# Patient Record
Sex: FEMALE | Race: WHITE | Employment: FULL TIME | URBAN - METROPOLITAN AREA
[De-identification: names, ages, dates, MRNs, and addresses within clinical notes are randomized per-mention and may not be internally consistent; named-entity substitution may affect disease eponyms.]

---

## 2017-10-06 ENCOUNTER — HOSPITAL ENCOUNTER (OUTPATIENT)
Dept: MAMMOGRAPHY | Age: 62
Discharge: HOME | End: 2017-10-09
Attending: FAMILY MEDICINE | Admitting: FAMILY MEDICINE

## 2017-10-06 DIAGNOSIS — Z12.31 VISIT FOR SCREENING MAMMOGRAM: ICD-10-CM

## 2018-09-21 ENCOUNTER — OFFICE VISIT (OUTPATIENT)
Dept: FAMILY MEDICINE CLINIC | Age: 63
End: 2018-09-21

## 2018-09-21 VITALS
HEIGHT: 68 IN | HEART RATE: 49 BPM | WEIGHT: 197.8 LBS | DIASTOLIC BLOOD PRESSURE: 70 MMHG | SYSTOLIC BLOOD PRESSURE: 120 MMHG | BODY MASS INDEX: 29.98 KG/M2

## 2018-09-21 DIAGNOSIS — R00.1 SINUS BRADYCARDIA: ICD-10-CM

## 2018-09-21 DIAGNOSIS — E66.9 OBESITY (BMI 30.0-34.9): ICD-10-CM

## 2018-09-21 DIAGNOSIS — L85.3 DRY SKIN: ICD-10-CM

## 2018-09-21 DIAGNOSIS — L71.9 ROSACEA: ICD-10-CM

## 2018-09-21 DIAGNOSIS — Z00.00 ANNUAL PHYSICAL EXAM: Primary | ICD-10-CM

## 2018-09-21 DIAGNOSIS — Z80.41 FAMILY HISTORY OF OVARIAN CANCER: ICD-10-CM

## 2018-09-21 DIAGNOSIS — H61.21 IMPACTED CERUMEN, RIGHT EAR: ICD-10-CM

## 2018-09-21 DIAGNOSIS — Z23 NEED FOR INFLUENZA VACCINATION: ICD-10-CM

## 2018-09-21 PROBLEM — E66.811 OBESITY (BMI 30.0-34.9): Status: ACTIVE | Noted: 2018-09-21

## 2018-09-21 LAB
ANION GAP SERPL CALCULATED.3IONS-SCNC: 12 MMOL/L (ref 3–16)
BASOPHILS ABSOLUTE: 0 K/UL (ref 0–0.2)
BASOPHILS RELATIVE PERCENT: 0.6 %
BUN BLDV-MCNC: 16 MG/DL (ref 7–20)
CALCIUM SERPL-MCNC: 8.3 MG/DL (ref 8.3–10.6)
CHLORIDE BLD-SCNC: 107 MMOL/L (ref 99–110)
CHOLESTEROL, TOTAL: 230 MG/DL (ref 0–199)
CO2: 22 MMOL/L (ref 21–32)
CREAT SERPL-MCNC: 0.8 MG/DL (ref 0.6–1.2)
EOSINOPHILS ABSOLUTE: 0 K/UL (ref 0–0.6)
EOSINOPHILS RELATIVE PERCENT: 1.1 %
ESTIMATED AVERAGE GLUCOSE: 122.6 MG/DL
GFR AFRICAN AMERICAN: >60
GFR NON-AFRICAN AMERICAN: >60
GLUCOSE BLD-MCNC: 88 MG/DL (ref 70–99)
HBA1C MFR BLD: 5.9 %
HCT VFR BLD CALC: 38.9 % (ref 36–48)
HDLC SERPL-MCNC: 57 MG/DL (ref 40–60)
HEMOGLOBIN: 13 G/DL (ref 12–16)
HEPATITIS C ANTIBODY INTERPRETATION: NORMAL
LDL CHOLESTEROL CALCULATED: 149 MG/DL
LYMPHOCYTES ABSOLUTE: 1.6 K/UL (ref 1–5.1)
LYMPHOCYTES RELATIVE PERCENT: 35.1 %
MCH RBC QN AUTO: 29.5 PG (ref 26–34)
MCHC RBC AUTO-ENTMCNC: 33.3 G/DL (ref 31–36)
MCV RBC AUTO: 88.5 FL (ref 80–100)
MONOCYTES ABSOLUTE: 0.4 K/UL (ref 0–1.3)
MONOCYTES RELATIVE PERCENT: 9.4 %
NEUTROPHILS ABSOLUTE: 2.5 K/UL (ref 1.7–7.7)
NEUTROPHILS RELATIVE PERCENT: 53.8 %
PDW BLD-RTO: 14 % (ref 12.4–15.4)
PLATELET # BLD: 208 K/UL (ref 135–450)
PMV BLD AUTO: 8.5 FL (ref 5–10.5)
POTASSIUM SERPL-SCNC: 4.3 MMOL/L (ref 3.5–5.1)
RBC # BLD: 4.39 M/UL (ref 4–5.2)
SODIUM BLD-SCNC: 141 MMOL/L (ref 136–145)
TRIGL SERPL-MCNC: 118 MG/DL (ref 0–150)
TSH SERPL DL<=0.05 MIU/L-ACNC: 0.92 UIU/ML (ref 0.27–4.2)
VLDLC SERPL CALC-MCNC: 24 MG/DL
WBC # BLD: 4.6 K/UL (ref 4–11)

## 2018-09-21 PROCEDURE — 90471 IMMUNIZATION ADMIN: CPT | Performed by: FAMILY MEDICINE

## 2018-09-21 PROCEDURE — 90686 IIV4 VACC NO PRSV 0.5 ML IM: CPT | Performed by: FAMILY MEDICINE

## 2018-09-21 PROCEDURE — 93000 ELECTROCARDIOGRAM COMPLETE: CPT | Performed by: FAMILY MEDICINE

## 2018-09-21 PROCEDURE — 36415 COLL VENOUS BLD VENIPUNCTURE: CPT | Performed by: FAMILY MEDICINE

## 2018-09-21 PROCEDURE — 99386 PREV VISIT NEW AGE 40-64: CPT | Performed by: FAMILY MEDICINE

## 2018-09-21 RX ORDER — METRONIDAZOLE 10 MG/G
GEL TOPICAL 2 TIMES DAILY
Qty: 15 G | Refills: 2 | Status: SHIPPED | OUTPATIENT
Start: 2018-09-21 | End: 2022-07-11 | Stop reason: SDUPTHER

## 2018-09-21 ASSESSMENT — PATIENT HEALTH QUESTIONNAIRE - PHQ9
SUM OF ALL RESPONSES TO PHQ QUESTIONS 1-9: 0
1. LITTLE INTEREST OR PLEASURE IN DOING THINGS: 0
SUM OF ALL RESPONSES TO PHQ9 QUESTIONS 1 & 2: 0
2. FEELING DOWN, DEPRESSED OR HOPELESS: 0
SUM OF ALL RESPONSES TO PHQ QUESTIONS 1-9: 0

## 2018-09-21 NOTE — PROGRESS NOTES
Vaccine Information Sheet, \"Influenza - Inactivated\"  given to Randall Post, or parent/legal guardian of  Randall Post and verbalized understanding. Patient responses:    Have you ever had a reaction to a flu vaccine? No  Are you able to eat eggs without adverse effects? Yes  Do you have any current illness? No  Have you ever had Guillian [place] Syndrome? No    Flu vaccine given per order. Please see immunization tab.

## 2018-09-21 NOTE — PATIENT INSTRUCTIONS
Patient Education        Seborrheic Dermatitis: Care Instructions  Your Care Instructions  Seborrheic dermatitis (say \"xel-knm-YEH-ick nzi-oat-BD-tus\") is a skin problem that causes a reddish rash with greasy, flaky, yellow skin patches. The rash may appear on many parts of the body. It may be on the scalp, face (especially the eyebrow area and between the nose and mouth), ears, breasts, underarms, and genital area. The flaky skin on the scalp is called dandruff. This rash is often a long-term (chronic) condition. It may last for years. But the symptoms may come and go. Symptoms can be treated with special creams, shampoos, or other skin care. The cause of seborrheic dermatitis is not fully understood. It may occur when skin glands make too much oil. It may get worse in cold weather or with stress. A type of skin fungus, or yeast, may also be linked with this condition. Follow-up care is a key part of your treatment and safety. Be sure to make and go to all appointments, and call your doctor if you are having problems. It's also a good idea to know your test results and keep a list of the medicines you take. How can you care for yourself at home? · If your doctor prescribes a steroid cream, dandruff shampoo, or antifungal cream or medicine, use it as directed. If your doctor prescribes other medicine, take it as directed. · Use a dandruff shampoo if seborrheic dermatitis affects your scalp. This includes Head & Shoulders, Sebulex, and Selsun Blue. You may need to try a few kinds of shampoo to find the one that works best for you. · To help with itching:  ¨ Use hydrocortisone cream. Follow the directions on the label. ¨ Use cold, wet cloths. ¨ Take an over-the-counter antihistamine, such as diphenhydramine (Benadryl) or loratadine (Claritin). Read and follow all instructions on the label. When should you call for help?   Call your doctor now or seek immediate medical care if:    · You have signs of have, such as having a close relative with osteoporosis or taking a medicine that can weaken bones. Your doctor can tell you the best ways to protect your bones from thinning. Follow-up care is a key part of your treatment and safety. Be sure to make and go to all appointments, and call your doctor if you are having problems. It's also a good idea to know your test results and keep a list of the medicines you take. How can you care for yourself at home? · Get enough calcium and vitamin D. The [place] [employer] recommends adults younger than age 46 need 1,000 mg of calcium and 600 IU of vitamin D each day. Women ages 46 to 79 need 1,200 mg of calcium and 600 IU of vitamin D each day. Men ages 46 to 79 need 1,000 mg of calcium and 600 IU of vitamin D each day. Adults 71 and older need 1,200 mg of calcium and 800 IU of vitamin D each day. ¨ Eat foods rich in calcium, like yogurt, cheese, milk, and dark green vegetables. ¨ Eat foods rich in vitamin D, like eggs, fatty fish, cereal, and fortified milk. ¨ Get some sunshine. Your body uses sunshine to make its own vitamin D. The safest time to be out in the sun is before 10 a.m. or after 3 p.m. Avoid getting sunburned. Sunburn can increase your risk of skin cancer. ¨ Talk to your doctor about taking a calcium plus vitamin D supplement. Ask about what type of calcium is right for you, and how much to take at a time. Adults ages 23 to 48 should not get more than 2,500 mg of calcium and 4,000 IU of vitamin D each day, whether it is from supplements and/or food. Adults ages 46 and older should not get more than 2,000 mg of calcium and 4,000 IU of vitamin D each day from supplements and/or food. · Get regular bone-building exercise. Weight-bearing and resistance exercises keep bones healthy by working the muscles and bones against gravity. Start out at an exercise level that feels right for you.  Add a little at a time until you can do the following:  ¨ Do 30 minutes the fats are mainly from nuts, unsaturated oils (such as fish oils and olive oil), and certain nut or seed oils (such as canola, soybean, or flaxseed oil). These fats may help protect the heart and blood vessels. How can you get started on the Mediterranean diet? Here are some things you can do to switch to a more Mediterranean way of eating. What to eat  · Eat a variety of fruits and vegetables each day, such as grapes, blueberries, tomatoes, broccoli, peppers, figs, olives, spinach, eggplant, beans, lentils, and chickpeas. · Eat a variety of whole-grain foods each day, such as oats, brown rice, and whole wheat bread, pasta, and couscous. · Eat fish at least 2 times a week. Try tuna, salmon, mackerel, lake trout, herring, or sardines. · Eat moderate amounts of low-fat dairy products, such as milk, cheese, or yogurt. · Eat moderate amounts of poultry and eggs. · Choose healthy (unsaturated) fats, such as nuts, olive oil, and certain nut or seed oils like canola, soybean, and flaxseed. · Limit unhealthy (saturated) fats, such as butter, palm oil, and coconut oil. And limit fats found in animal products, such as meat and dairy products made with whole milk. Try to eat red meat only a few times a month in very small amounts. · Limit sweets and desserts to only a few times a week. This includes sugar-sweetened drinks like soda. The Mediterranean diet may also include red wine with your meal-1 glass each day for women and up to 2 glasses a day for men. Tips for eating at home  · Use herbs, spices, garlic, lemon zest, and citrus juice instead of salt to add flavor to foods. · Add avocado slices to your sandwich instead of sorensen. · Have fish for lunch or dinner instead of red meat. Brush the fish with olive oil, and broil or grill it. · Sprinkle your salad with seeds or nuts instead of cheese. · Cook with olive or canola oil instead of butter or oils that are high in saturated fat.   · Switch from 2% milk or

## 2018-09-25 PROBLEM — R73.03 PREDIABETES: Status: ACTIVE | Noted: 2018-09-25

## 2018-09-25 LAB
HPV COMMENT: NORMAL
HPV TYPE 16: NOT DETECTED
HPV TYPE 18: NOT DETECTED
HPVOH (OTHER TYPES): NOT DETECTED

## 2020-07-01 ENCOUNTER — OFFICE VISIT (OUTPATIENT)
Dept: PRIMARY CARE CLINIC | Age: 65
End: 2020-07-01

## 2020-07-01 ENCOUNTER — HOSPITAL ENCOUNTER (OUTPATIENT)
Age: 65
Setting detail: SPECIMEN
Discharge: HOME | End: 2020-07-01
Payer: COMMERCIAL

## 2020-07-01 VITALS — HEART RATE: 60 BPM | TEMPERATURE: 98.4 F | OXYGEN SATURATION: 98 %

## 2020-07-01 PROCEDURE — 99213 OFFICE O/P EST LOW 20 MIN: CPT | Performed by: FAMILY MEDICINE

## 2020-07-01 PROCEDURE — U0002 COVID-19 LAB TEST NON-CDC: HCPCS

## 2020-07-01 NOTE — PATIENT INSTRUCTIONS
Your COVID 19 will take 3-4 days for the results come back. The [address] will notify you if your test result are POSITIVE. We ask that you make a Mychart page and view your test results. The office will notify if results are Negative. You will need to Self quarantine until you know your results  Increase fluids rest  Saline nasal spray as directed  Warm salt gargles for throat discomfort  Monitor temperature twice a day  Tylenol for fevers and/or discomfort. If symptoms are worse -Go to the ER. Follow up with your primary doctor    To Whom it May Concern:    Omayra Pederson has been tested for COVID. They may not return to work until their lab test results back and they been fever free for 3 days.   If test is positive they must stay home for 2 weeks or until they test negative

## 2020-07-03 LAB
SARS-COV-2: NOT DETECTED
SOURCE: NORMAL

## 2020-07-06 ENCOUNTER — VIRTUAL VISIT (OUTPATIENT)
Dept: FAMILY MEDICINE CLINIC | Age: 65
End: 2020-07-06
Payer: COMMERCIAL

## 2020-07-06 ENCOUNTER — TELEPHONE (OUTPATIENT)
Dept: FAMILY MEDICINE CLINIC | Age: 65
End: 2020-07-06

## 2020-07-06 PROCEDURE — 99441 PR PHYS/QHP TELEPHONE EVALUATION 5-10 MIN: CPT | Performed by: FAMILY MEDICINE

## 2020-07-06 NOTE — PATIENT INSTRUCTIONS
October Fifth is the DEADLINE for voter registration for the November Third GENERAL ELECTION. Don't forget to vote!!!        [address] TO ALL APPOINTMENTS    The diagnoses and medications listed in this after visit summary may not be accurate at the time of check out. Please check MY CHART in 28-48 hours for possible corrections. Late cancellation policy: So that we can better accommodate people who are sick, please give our office 24 hour notice for an appointment cancellation. Thank you. Missed appointments: Your care is very important to us. It is important that you keep your scheduled appointments. Multiple missed appointments will lead to a dismissal from the office. Later arrival policy: If you are more than 10 minutes late for your appointment, you will be asked to reschedule. Please allow 5-7 business days for paperwork to be processed. It is important that you check your MY Chart messages, as they include appointment reminders, test results, and other important information. If you have forgotten your password, please call [phone].

## 2020-07-06 NOTE — TELEPHONE ENCOUNTER
Patient stated that she went and got tested for the COVID-19 test on Wednesday and got her results back today which was negative, patient stated that the reason she went to get test is because she had an headache. Patient stated that she is still having the headaches and she is having to take Tylenol every 6 hours, when she takes the tylenol the headache goes away but returns 6 hours later. Patient asking what can she do?  Please advise

## 2020-07-06 NOTE — TELEPHONE ENCOUNTER
Patient called7/3/2020. Just got tested for COVID-19  She does not know the results. She went there because of her symptoms of fever and headache. Is asking what she can take for her headache. She is already taking Tylenol extra strength 2 tablets every 6 hours. The headache is dull. There is no specific location of the headache    Told her she could add on ibuprofen 800 mg every 8 hours as needed. She understands.

## 2020-07-06 NOTE — PROGRESS NOTES
Kishan Quach is a 59 y.o. female evaluated via telephone on 7/6/2020. Consent:  She and/or health care decision maker is aware that that she may receive a bill for this telephone service, depending on her insurance coverage, and has provided verbal consent to proceed: Yes      Documentation:  I communicated with the patient and/or health care decision maker about headache:  See telephone encounter. Continued to have HA up to yesterday. Now PATTEN is much improved. There is no headache currently. She got the results of her COVID-19 testing which was negative. This made a big difference in regards to her stress level headache is diffuse and does go into her lower neck and upper back. Details of this discussion including any medical advice provided: Can take Tylenol up to thousand milligrams 3 times a day. Can take the ibuprofen up to 800 mg 3 times a day. Can also use ice packs on the back of her neck. Alternate so that is not keeping an ice pack in 1 place more than 20 minutes/h. I did offer her a muscle relaxer which she declined to take. Diagnosis Orders   1. Tension headache           I affirm this is a Patient Initiated Episode with a Patient who has not had a related appointment within my department in the past 7 days or scheduled within the next 24 hours.     Patient identification was verified at the start of the visit: Yes    Total Time: minutes: 5-10 minutes    Note: not billable if this call serves to triage the patient into an appointment for the relevant concern      Melani Connors

## 2020-07-14 ENCOUNTER — TELEPHONE (OUTPATIENT)
Dept: FAMILY MEDICINE CLINIC | Age: 65
End: 2020-07-14

## 2020-07-14 NOTE — TELEPHONE ENCOUNTER
She wants to know if she can do a sick visit, that this been going on for two weeks. The fever comes and go over the weekend it was bad. But its the headaches, she was not well at all last night. She says the only thing that is helping her us the motrinand tylenol is the only thing that is helping her. She wants to seen, if you want her to get tested again for the COVID again she will. If you want her to go to the ER she will, but everything happens at night and she does not want to go the ER and having them looking at her like she is nuts. Again patient stated she just want to see doctor because she has been sick for two weeks.

## 2020-07-14 NOTE — TELEPHONE ENCOUNTER
Please see my chart message below. Please call patient and postpone her appointment until she is feeling better. When she is feeling better, we can schedule her physical 2 weeks later. I have a Visit on July 15th at 8:15 AM.  I have been tested for [address] but was negative. I want you to understand that my symptoms can be chills, fever, muscle ache but mainly head and neck ache that only goes away with Tylenol or motrin. Usually my temperature spikes at night. It's usually fine in the morning. I will wear a mask  But you might want to hustle me into a room. [email or web address]  home phone [phone]     I did receive notice of my appointment and plan to attend. Thank you.

## 2020-07-15 ENCOUNTER — OFFICE VISIT (OUTPATIENT)
Dept: PRIMARY CARE CLINIC | Age: 65
End: 2020-07-15
Payer: COMMERCIAL

## 2020-07-15 ENCOUNTER — HOSPITAL ENCOUNTER (OUTPATIENT)
Age: 65
Setting detail: SPECIMEN
Discharge: HOME | End: 2020-07-15
Payer: COMMERCIAL

## 2020-07-15 VITALS — TEMPERATURE: 97.2 F

## 2020-07-15 PROCEDURE — 99213 OFFICE O/P EST LOW 20 MIN: CPT | Performed by: FAMILY MEDICINE

## 2020-07-15 PROCEDURE — U0002 COVID-19 LAB TEST NON-CDC: HCPCS

## 2020-07-15 RX ORDER — DOXYCYCLINE HYCLATE 100 MG
100 TABLET ORAL 2 TIMES DAILY
Qty: 28 TABLET | Refills: 0 | Status: SHIPPED | OUTPATIENT
Start: 2020-07-15 | End: 2020-07-29

## 2020-07-15 NOTE — PATIENT INSTRUCTIONS
Your COVID 19 will take 3-4 days for the results come back. The [address] will notify you if your test result are POSITIVE. We ask that you make a Mychart page and view your test results. The office will notify if results are Negative. You will need to Self quarantine until you know your results  Increase fluids rest  Saline nasal spray as directed  Warm salt gargles for throat discomfort  Monitor temperature twice a day  Tylenol for fevers and/or discomfort. If symptoms are worse -Go to the ER. Follow up with your primary doctor    To Whom it May Concern:    Zulma Bettencourt has been tested for COVID. They may not return to work until their lab test results back and they been fever free for 3 days.   If test is positive they must stay home for 2 weeks or until they test negative

## 2020-07-15 NOTE — PROGRESS NOTES
7/15/2020    HPI:  Chief complaint and history of present illness as per medical assistant/nurse documented today in the Flu/COVID-19 clinic. Rash for 5 days. Intermittant fever for 2 wks. Both mosquito and tick poss exposures. MEDICATIONS:  Prior to Visit Medications    Medication Sig Taking? Authorizing Provider   metroNIDAZOLE (METROGEL) 1 % gel Apply topically 2 times daily  Tung Holt MD           PHYSICAL EXAM:  cta  rrr no murmur  6 cm erythemic patches b ue's    ASSESSMENT/PLAN:  1. Flu-like symptoms  R/o vid - doubt    Probable vector borne illness    Doxy 100 bid for 14 days    - COVID-19 Ambulatory      FOLLOW-UP:  No follow-ups on file.     In addition to other information, the printed after visit summary provided to the patient includes:  [x] COVID-19 Self care instructions  [x] COVID-19 General patient information

## 2020-07-15 NOTE — PROGRESS NOTES
7/15/20  Gen Hicks  1955    FLU/COVID-19 CLINIC EVALUATION Pt seen 2 weeks ago (-) Covid result. SX persist.    HPI SYMPTOMS:    Employer: [employer]    [x] Fevers  [] Chills  [] Cough  [] Coughing up blood  [] Chest Congestion  [] Nasal Congestion  [] Feeling short of breath  [] Sometimes  [] Frequently  [] All the time  [] Chest pain  [x] Headaches  [x]Tolerable  [] Severe  [] Sore throat  [] Muscle aches  [] Nausea  [] Vomiting  []Unable to keep fluids down  [] Diarrhea  []Severe    [] OTHER SYMPTOMS:      Symptom Duration:   [] 1  [] 2   [] 3   [] 4    [] 5   [] 6   [] 7   [] 8   [] 9   [] 10   [] 11   [] 12   [] 13   [] 14   [x] Longer than 14 days    Symptom course:   [x] Worsening     [] Stable     [] Improving    RISK FACTORS:    [] Pregnant or possibly pregnant  [] Age over 61  [] Diabetes  [] Heart disease  [] Asthma  [] COPD/Other chronic lung diseases  [] Active Cancer  [] On Chemotherapy  [] Taking oral steroids  [] History Lymphoma/Leukemia  [] Close contact with a lab confirmed COVID-19 patient within 14 days of symptom onset  [] History of travel from affected geographical areas within 14 days of symptom onset       VITALS:  There were no vitals filed for this visit. TESTS:    POCT FLU:  [] Positive     []Negative    ASSESSMENT:    [] Flu  [] Possible COVID-19  [] Strep    PLAN:    [] Discharge home with written instructions for:  [] Flu management  [] Possible COVID-19 infection with self-quarantine and management of symptoms  [] Follow-up with primary care physician or emergency department if worsens  [] Evaluation per physician/nurse practitioner in clinic  [] Sent to ER       An  electronic signature was used to authenticate this note.      --Heri Anna MA on 7/15/2020 at 8:06 AM

## 2020-07-16 ENCOUNTER — HOSPITAL ENCOUNTER (OUTPATIENT)
Age: 65
Discharge: HOME | End: 2020-07-16
Payer: COMMERCIAL

## 2020-07-16 ENCOUNTER — VIRTUAL VISIT (OUTPATIENT)
Dept: FAMILY MEDICINE CLINIC | Age: 65
End: 2020-07-16
Payer: COMMERCIAL

## 2020-07-16 LAB
BASOPHILS ABSOLUTE: 0 K/CU MM
BASOPHILS RELATIVE PERCENT: 0.4 % (ref 0–1)
DIFFERENTIAL TYPE: ABNORMAL
EOSINOPHILS ABSOLUTE: 0.1 K/CU MM
EOSINOPHILS RELATIVE PERCENT: 0.9 % (ref 0–3)
ERYTHROCYTE SEDIMENTATION RATE: 86 MM/HR (ref 0–30)
HCT VFR BLD CALC: 35.9 % (ref 37–47)
HEMOGLOBIN: 11.2 GM/DL (ref 12.5–16)
IMMATURE NEUTROPHIL %: 0.7 % (ref 0–0.43)
LYMPHOCYTES ABSOLUTE: 1.3 K/CU MM
LYMPHOCYTES RELATIVE PERCENT: 16.8 % (ref 24–44)
MCH RBC QN AUTO: 28.6 PG (ref 27–31)
MCHC RBC AUTO-ENTMCNC: 31.2 % (ref 32–36)
MCV RBC AUTO: 91.6 FL (ref 78–100)
MONOCYTES ABSOLUTE: 0.3 K/CU MM
MONOCYTES RELATIVE PERCENT: 4.4 % (ref 0–4)
NUCLEATED RBC %: 0 %
PDW BLD-RTO: 13.5 % (ref 11.7–14.9)
PLATELET # BLD: 286 K/CU MM (ref 140–440)
PMV BLD AUTO: 9.7 FL (ref 7.5–11.1)
RBC # BLD: 3.92 M/CU MM (ref 4.2–5.4)
SEGMENTED NEUTROPHILS ABSOLUTE COUNT: 5.8 K/CU MM
SEGMENTED NEUTROPHILS RELATIVE PERCENT: 76.8 % (ref 36–66)
TOTAL CK: 21 IU/L (ref 26–140)
TOTAL IMMATURE NEUTOROPHIL: 0.05 K/CU MM
TOTAL NUCLEATED RBC: 0 K/CU MM
WBC # BLD: 7.5 K/CU MM (ref 4–10.5)

## 2020-07-16 PROCEDURE — 99213 OFFICE O/P EST LOW 20 MIN: CPT | Performed by: FAMILY MEDICINE

## 2020-07-16 PROCEDURE — 85652 RBC SED RATE AUTOMATED: CPT

## 2020-07-16 PROCEDURE — 86788 WEST NILE VIRUS AB IGM: CPT

## 2020-07-16 PROCEDURE — 86789 WEST NILE VIRUS ANTIBODY: CPT

## 2020-07-16 PROCEDURE — 86618 LYME DISEASE ANTIBODY: CPT

## 2020-07-16 PROCEDURE — 82550 ASSAY OF CK (CPK): CPT

## 2020-07-16 PROCEDURE — 82085 ASSAY OF ALDOLASE: CPT

## 2020-07-16 PROCEDURE — 36415 COLL VENOUS BLD VENIPUNCTURE: CPT

## 2020-07-16 PROCEDURE — 85025 COMPLETE CBC W/AUTO DIFF WBC: CPT

## 2020-07-16 RX ORDER — TIZANIDINE 4 MG/1
4 TABLET ORAL EVERY 8 HOURS PRN
Qty: 25 TABLET | Refills: 0 | Status: SHIPPED | OUTPATIENT
Start: 2020-07-16 | End: 2021-02-22

## 2020-07-16 NOTE — PATIENT INSTRUCTIONS
October Fifth is the DEADLINE for voter registration for the November Third GENERAL ELECTION. Don't forget to vote!!!        [address] TO ALL APPOINTMENTS    The diagnoses and medications listed in this after visit summary may not be accurate at the time of check out. Please check MY CHART in 28-48 hours for possible corrections. Late cancellation policy: So that we can better accommodate people who are sick, please give our office 24 hour notice for an appointment cancellation. Thank you. Missed appointments: Your care is very important to us. It is important that you keep your scheduled appointments. Multiple missed appointments will lead to a dismissal from the office. Later arrival policy: If you are more than 10 minutes late for your appointment, you will be asked to reschedule. Please allow 5-7 business days for paperwork to be processed. It is important that you check your MY Chart messages, as they include appointment reminders, test results, and other important information. If you have forgotten your password, please call [phone].

## 2020-07-16 NOTE — PROGRESS NOTES
2020    TELEHEALTH EVALUATION -- Audio/Visual (During AQIXR-63 public health emergency)    HPI:    Con Cobian (:  1955) has requested an audio/video evaluation for the following concern(s):        Muscle aches: got re-tested for Covid 19. Muscle aches and fevers for 2 weeks. Shoulder, head, and neck pain on the left. Fever at night. Taking Advil and Motrin. Legs and back aching. The doctor at the [facility] thought she might have  Been exposed to a tick bite. Therefore he sent over doxycycline antibiotics. She has taken 3 antibiotic pills so far. Review of Systems   Constitutional: Positive for chills and fever. Negative for activity change. Musculoskeletal: Positive for myalgias. Prior to Visit Medications    Medication Sig Taking? Authorizing Provider   doxycycline hyclate (VIBRA-TABS) 100 MG tablet Take 1 tablet by mouth 2 times daily for 14 days  Deric Dunaway MD   metroNIDAZOLE (METROGEL) 1 % gel Apply topically 2 times daily  Braeden Escobar MD       Social History     Tobacco Use    Smoking status: Never Smoker    Smokeless tobacco: Never Used   Substance Use Topics    Alcohol use: No    Drug use: Not on file        Allergies   Allergen Reactions    Dye [Iodides] Hives   , No past medical history on file.     PHYSICAL EXAMINATION:  [ INSTRUCTIONS:  \"[x]\" Indicates a positive item  \"[]\" Indicates a negative item  -- DELETE ALL ITEMS NOT EXAMINED]  Vital Signs: (As obtained by patient/caregiver or practitioner observation)    Blood pressure-  Heart rate-    Respiratory rate-    Temperature-  Pulse oximetry-     Constitutional: [x] Appears well-developed and well-nourished [x] No apparent distress      [] Abnormal-   Mental status  [x] Alert and awake  [x] Oriented to person/place/time []Able to follow commands      Eyes:  EOM    []  Normal  [] Abnormal-  Sclera  []  Normal  [] Abnormal -         Discharge []  None visible  [] Abnormal -    HENT:   [x] Normocephalic, atraumatic. [] Abnormal   [] Mouth/Throat: Mucous membranes are moist.     External Ears [] Normal  [] Abnormal-     Neck: [x] No visualized mass     Pulmonary/Chest: [x] Respiratory effort normal.  [x] No visualized signs of difficulty breathing or respiratory distress        [] Abnormal-      Musculoskeletal:   [x] Normal gait with no signs of ataxia         [] Normal range of motion of neck        [] Abnormal-       Neurological:        [x] No Facial Asymmetry (Cranial nerve 7 motor function) (limited exam to video visit)          [] No gaze palsy        [] Abnormal-         Skin:        [] No significant exanthematous lesions or discoloration noted on facial skin         [] Abnormal-            Psychiatric:       [x] Normal Affect [] No Hallucinations        [] Abnormal-     Other pertinent observable physical exam findings-     ASSESSMENT/PLAN:  There are no diagnoses linked to this encounter. Diagnosis Orders   1. Fever, unspecified fever cause  Lyme Disease AB Total W Rflx to IgG/ IgM    [place] ANTIBODIES, IGG AND IGM    CBC Auto Differential    ALDOLASE    CK    Sedimentation Rate   2. Muscle ache  Lyme Disease AB Total W Rflx to IgG/ IgM    [place] ANTIBODIES, IGG AND IGM    CBC Auto Differential    ALDOLASE    CK    Sedimentation Rate    tiZANidine (ZANAFLEX) 4 MG tablet       No follow-ups on file. Tiff Olivo is a 59 y.o. female being evaluated by a Virtual Visit (video visit) encounter to address concerns as mentioned above. A caregiver was present when appropriate. Due to this being a TeleHealth encounter (During JOXEK-08 public health emergency), evaluation of the following organ systems was limited: Vitals/Constitutional/EENT/Resp/CV/GI//MS/Neuro/Skin/Heme-Lymph-Imm.   Pursuant to the emergency declaration under the [facility], [address] [place] [address] authority and the [employer] General Act, this Virtual Visit was conducted with patient's (and/or legal guardian's) consent, to reduce the patient's risk of exposure to COVID-19 and provide necessary medical care. The patient (and/or legal guardian) has also been advised to contact this office for worsening conditions or problems, and seek emergency medical treatment and/or call 911 if deemed necessary. Patient identification was verified at the start of the visit: Yes    Total time spent on this encounter: Not billed by time    Services were provided through a video synchronous discussion virtually to substitute for in-person clinic visit. Patient and provider were located at their individual homes. --Andrei Blankenship MD on 7/16/2020 at 9:21 AM    An electronic signature was used to authenticate this note.

## 2020-07-18 LAB — ALDOLASE: 8.6 U/L (ref 1.5–8.1)

## 2020-07-19 LAB
BORRELIA BURGDORFERI AB IGG: POSITIVE
LYME AB IGM BY WB:: POSITIVE

## 2020-07-20 LAB
SARS-COV-2: NOT DETECTED
SOURCE: NORMAL

## 2020-07-22 LAB
WEST NILE VIRUS, IGG: 0.13 IV
WEST NILE VIRUS, IGM: NORMAL IV

## 2021-02-22 ENCOUNTER — OFFICE VISIT (OUTPATIENT)
Dept: FAMILY MEDICINE CLINIC | Age: 66
End: 2021-02-22
Payer: MEDICARE

## 2021-02-22 VITALS
SYSTOLIC BLOOD PRESSURE: 116 MMHG | WEIGHT: 201.4 LBS | TEMPERATURE: 97.1 F | DIASTOLIC BLOOD PRESSURE: 72 MMHG | HEART RATE: 79 BPM | BODY MASS INDEX: 30.52 KG/M2 | HEIGHT: 68 IN | OXYGEN SATURATION: 95 %

## 2021-02-22 DIAGNOSIS — Z13.220 SCREENING CHOLESTEROL LEVEL: ICD-10-CM

## 2021-02-22 DIAGNOSIS — Z23 NEED FOR 23-POLYVALENT PNEUMOCOCCAL POLYSACCHARIDE VACCINE: ICD-10-CM

## 2021-02-22 DIAGNOSIS — Z00.00 ANNUAL PHYSICAL EXAM: Primary | ICD-10-CM

## 2021-02-22 DIAGNOSIS — Z78.0 POST-MENOPAUSAL: ICD-10-CM

## 2021-02-22 DIAGNOSIS — Z23 NEED FOR SHINGLES VACCINE: ICD-10-CM

## 2021-02-22 DIAGNOSIS — Z80.41 FAMILY HISTORY OF OVARIAN CANCER: ICD-10-CM

## 2021-02-22 DIAGNOSIS — Z13.1 SCREENING FOR DIABETES MELLITUS: ICD-10-CM

## 2021-02-22 DIAGNOSIS — Z12.31 ENCOUNTER FOR SCREENING MAMMOGRAM FOR MALIGNANT NEOPLASM OF BREAST: ICD-10-CM

## 2021-02-22 DIAGNOSIS — Z12.11 SCREEN FOR COLON CANCER: ICD-10-CM

## 2021-02-22 DIAGNOSIS — Z23 NEEDS FLU SHOT: ICD-10-CM

## 2021-02-22 DIAGNOSIS — R73.03 PREDIABETES: ICD-10-CM

## 2021-02-22 LAB
CHOLESTEROL, TOTAL: 261 MG/DL (ref 0–199)
ESTIMATED AVERAGE GLUCOSE: 122.6 MG/DL
HBA1C MFR BLD: 5.9 %
HDLC SERPL-MCNC: 63 MG/DL (ref 40–60)
LDL CHOLESTEROL CALCULATED: 173 MG/DL
TRIGL SERPL-MCNC: 124 MG/DL (ref 0–150)
VLDLC SERPL CALC-MCNC: 25 MG/DL

## 2021-02-22 PROCEDURE — 90694 VACC AIIV4 NO PRSRV 0.5ML IM: CPT | Performed by: FAMILY MEDICINE

## 2021-02-22 PROCEDURE — 36415 COLL VENOUS BLD VENIPUNCTURE: CPT | Performed by: FAMILY MEDICINE

## 2021-02-22 PROCEDURE — G0008 ADMIN INFLUENZA VIRUS VAC: HCPCS | Performed by: FAMILY MEDICINE

## 2021-02-22 PROCEDURE — 90732 PPSV23 VACC 2 YRS+ SUBQ/IM: CPT | Performed by: FAMILY MEDICINE

## 2021-02-22 PROCEDURE — G0009 ADMIN PNEUMOCOCCAL VACCINE: HCPCS | Performed by: FAMILY MEDICINE

## 2021-02-22 PROCEDURE — 99397 PER PM REEVAL EST PAT 65+ YR: CPT | Performed by: FAMILY MEDICINE

## 2021-02-22 ASSESSMENT — PATIENT HEALTH QUESTIONNAIRE - PHQ9
1. LITTLE INTEREST OR PLEASURE IN DOING THINGS: 0
SUM OF ALL RESPONSES TO PHQ QUESTIONS 1-9: 0
SUM OF ALL RESPONSES TO PHQ QUESTIONS 1-9: 0
2. FEELING DOWN, DEPRESSED OR HOPELESS: 0

## 2021-02-22 NOTE — PATIENT INSTRUCTIONS
Learning About the [facility] Diet  What is the Mediterranean diet? The Mediterranean diet is a style of eating rather than a diet plan. It features foods eaten in [place], [place], [place] and [place], and other countries along the [facility]. It emphasizes eating foods like fish, fruits, vegetables, beans, high-fiber breads and whole grains, nuts, and olive oil. This style of eating includes limited red meat, cheese, and sweets. Why choose the Mediterranean diet? A Mediterranean-style diet may improve heart health. It contains more fat than other heart-healthy diets. But the fats are mainly from nuts, unsaturated oils (such as fish oils and olive oil), and certain nut or seed oils (such as canola, soybean, or flaxseed oil). These fats may help protect the heart and blood vessels. How can you get started on the Mediterranean diet? Here are some things you can do to switch to a more Mediterranean way of eating. What to eat  · Eat a variety of fruits and vegetables each day, such as grapes, blueberries, tomatoes, broccoli, peppers, figs, olives, spinach, eggplant, beans, lentils, and chickpeas. · Eat a variety of whole-grain foods each day, such as oats, brown rice, and whole wheat bread, pasta, and couscous. · Eat fish at least 2 times a week. Try tuna, salmon, mackerel, lake trout, herring, or sardines. · Eat moderate amounts of low-fat dairy products, such as milk, cheese, or yogurt. · Eat moderate amounts of poultry and eggs. · Choose healthy (unsaturated) fats, such as nuts, olive oil, and certain nut or seed oils like canola, soybean, and flaxseed. · Limit unhealthy (saturated) fats, such as butter, palm oil, and coconut oil. And limit fats found in animal products, such as meat and dairy products made with whole milk. Try to eat red meat only a few times a month in very small amounts. · Limit sweets and desserts to only a few times a week. This includes sugar-sweetened drinks like soda. The Mediterranean diet may also include red wine with your meal1 glass each day for women and up to 2 glasses a day for men. Tips for eating at home  · Use herbs, spices, garlic, lemon zest, and citrus juice instead of salt to add flavor to foods. · Add avocado slices to your sandwich instead of sorensen. · Have fish for lunch or dinner instead of red meat. Brush the fish with olive oil, and broil or grill it. · Sprinkle your salad with seeds or nuts instead of cheese. · Cook with olive or canola oil instead of butter or oils that are high in saturated fat. · Switch from 2% milk or whole milk to 1% or fat-free milk. · Dip raw vegetables in a vinaigrette dressing or hummus instead of dips made from mayonnaise or sour cream.  · Have a piece of fruit for dessert instead of a piece of cake. Try baked apples, or have some dried fruit. Tips for eating out  · Try broiled, grilled, baked, or poached fish instead of having it fried or breaded. · Ask your  to have your meals prepared with olive oil instead of butter. · Order dishes made with marinara sauce or sauces made from olive oil. Avoid sauces made from cream or mayonnaise. · Choose whole-grain breads, whole wheat pasta and pizza crust, brown rice, beans, and lentils. · Cut back on butter or margarine on bread. Instead, you can dip your bread in a small amount of olive oil. · Ask for a side salad or grilled vegetables instead of french fries or chips. Where can you learn more? Go to https://OVIVO Mobile CommunicationspeFliteeb.Client24. org and sign in to your Ancestry account. Enter [phone] in the [facility] box to learn more about \"Learning About the Mediterranean Diet. \"     If you do not have an account, please click on the \"Sign Up Now\" link.   Current as of: November 7, 2018  Content Version: 12.1 © 9803-4584 [employer]. Care instructions adapted under license by [facility] ([facility]). If you have questions about a medical condition or this instruction, always ask your healthcare professional. [address] any warranty or liability for your use of this information. PLEASE BRING YOUR MEDICATIONS TO ALL APPOINTMENTS    The diagnoses and medications listed in this after visit summary may not be accurate at the time of check out. Please check MY CHART in 28-48 hours for possible corrections. Late cancellation policy: So that we can better accommodate people who are sick, please give our office 24 hour notice for an appointment cancellation. Thank you. Missed appointments: Your care is very important to us. It is important that you keep your scheduled appointments. Multiple missed appointments will lead to a dismissal from the office. Later arrival policy: If you are more than 10 minutes late for your appointment, you will be asked to reschedule. Please allow 5-7 business days for paperwork to be processed. It is important that you check your MY Chart messages, as they include appointment reminders, test results, and other important information. If you have forgotten your password, please call [phone]. HERE ARE SOME LIFE CHANGING TIPS      1. Take your blood pressure medications at night. This reduces your chance of cardiovascular event by half  2. Fever in kids: It's best to give both Tylenol and Ibuprofen at the same time rather than staggering them which is confusing  3. Follow these tips to reduce childhood obesity: Reduce unnecessary exposure to antibiotics, consume whole milk instead of skim milk, watch public TV instead of regular TV (less exposure to junk food commercials), and reduce traumatic experiences. 4. 1 egg per day is good for your heart  5. Alternate day fasting does promote weight loss. 6. Skipping breakfast increases your risk of obesity  7. Artificially sweetened drinks increase all cause mortality (strokes, BMI, cardiovascular)  8. Kale consumption can reduce onset of dementia  9. Walking at least 8000 steps per day and resistance exercise 2-3 x per week are good for your heart  10. Covid 19:  Wearing gloves is not that helpful  Having low vitamin D levels increases risk of infection (take 2-4000 units of D3 per day until our covid crisis is resolved)  11. Brushing teeth 3 times per day can decrease chance of getting diabetes    CHECK OUT KALEIGH ROSALES [facility] [address]     Walking for Exercise: Care Instructions  Your Care Instructions    Walking is one of the easiest ways to get the exercise you need for good health. A brisk, 30-minute walk each day can help you feel better and have more energy. It can help you lower your risk of disease. Walking can help you keep your bones strong and your heart healthy. Check with your doctor before you start a walking plan if you have heart problems, other health issues, or you have not been active in a long time. Follow your doctor's instructions for safe levels of exercise. Follow-up care is a key part of your treatment and safety. Be sure to make and go to all appointments, and call your doctor if you are having problems. It's also a good idea to know your test results and keep a list of the medicines you take. How can you care for yourself at home? Getting started  · Start slowly and set a short-term goal. For example, walk for 5 or 10 minutes every day. · Bit by bit, increase the amount you walk every day. Try for at least 30 minutes on most days of the week. You also may want to swim, bike, or do other activities. · If finding enough time is a problem, it is fine to be active in blocks of 10 minutes or more throughout your day and week. · To get the heart-healthy benefits of walking, you need to walk briskly enough to increase your heart rate and breathing, but not so fast that you cannot talk comfortably. · Wear comfortable shoes that fit well and provide good support for your feet and ankles. Staying with your plan  · After you've made walking a habit, set a longer-term goal. You may want to set a goal of walking briskly for longer or walking farther. Experts say to do 2½ hours of moderate activity a week. A faster heartbeat is what defines moderate-level activity. · To stay motivated, walk with friends, coworkers, or pets. · Use a phone grace or pedometer to track your steps each day. Set a goal to increase your steps. Once you get there, set a higher goal. Aim for 10,000 steps a day. · If the weather keeps you from walking outside, go for walks at the mall with a friend. Local schools and churches may have indoor gyms where you can walk. Fitting a walk into your workday  · Park several blocks away from work, or get off the bus a few stops early. · Use the stairs instead of the elevator, at least for a few floors. · Suggest holding meetings with colleagues during a walk inside or outside the building. · Use the restroom that is the farthest from your desk or workstation. · Use your morning and afternoon breaks to take quick 15-minute walks. Staying safe  · Know your surroundings. Walk in a well-lighted, safe place. If it is dark, walk with a partner. Wear light-colored clothing. If you can, buy a vest or jacket that reflects light. · Carry a cell phone for emergencies. · Drink plenty of water. Take a water bottle with you when you walk. This is very important if it is hot out. · Be careful not to slip on wet or icy ground. You can buy \"grippers\" for your shoes to help keep you from slipping. · Pay attention to your walking surface. Use sidewalks and paths. · If you have breathing problems like asthma or COPD, ask your doctor when it is safe for you to walk outdoors. Cold, dry air, smog, pollen, or other things in the air could cause breathing problems. Where can you learn more? Go to https://chpekoeb.Sayah. org and sign in to your ROKT account. Enter R159 in the [employer] box to learn more about \"Walking for Exercise: Care Instructions. \"     If you do not have an account, please click on the \"Sign Up Now\" link. Current as of: August 19, 2018  Content Version: 12.0  © 6025-7400 Healthwise, Incorporated. Care instructions adapted under license by [facility] ([facility]). If you have questions about a medical condition or this instruction, always ask your healthcare professional. [address] any warranty or liability for your use of this information.

## 2021-02-22 NOTE — PROGRESS NOTES
History and Physical      Disha Roe  YOB: 1955    Date of Service:  2/22/2021    Chief Complaint:   Disha Roe is a 72 y.o. female who presents for complete physical examination. HPI: Here for physical       Alcohol rare  Exercise: frequent  Seatbelt use:Yes  Sees dentist every 6 months: no  Intimate partner violence? No  Substance abuse? no    Wt Readings from Last 3 Encounters:   02/22/21 201 lb 6.4 oz (91.4 kg)   09/21/18 197 lb 12.8 oz (89.7 kg)     BP Readings from Last 3 Encounters:   02/22/21 116/72   09/21/18 120/70       Patient Active Problem List   Diagnosis    Obesity (BMI 30.0-34. 9)    Family history of ovarian cancer    Rosacea    Prediabetes    Other hyperlipidemia       Preventive Care:  Health Maintenance   Topic Date Due    Shingles Vaccine (1 of 2) Never done    Colon cancer screen colonoscopy  Never done    DEXA (modify frequency per FRAX score)  Never done    Breast cancer screen  10/06/2018    COVID-19 Vaccine (2 - Moderna 2-dose series) 03/09/2021    HIV screen  09/21/2068 (Originally 11/10/1970)    A1C test (Diabetic or Prediabetic)  02/22/2022    Lipid screen  02/22/2022    Cervical cancer screen  09/21/2023    DTaP/Tdap/Td vaccine (2 - Td) 06/09/2025    Flu vaccine  Completed    Pneumococcal 65+ years Vaccine  Completed    Hepatitis C screen  Completed    Hepatitis A vaccine  Aged Out    Hepatitis B vaccine  Aged Out    Hib vaccine  Aged Out    Meningococcal (ACWY) vaccine  Aged Out      Lipid panel:   Lab Results   Component Value Date    CHOL 261 (H) 02/22/2021    TRIG 124 02/22/2021    HDL 63 (H) 02/22/2021    LDLCALC 173 (H) 02/22/2021          Immunization History   Administered Date(s) Administered    COVID-19, Moderna, PF, 100mcg/0.5mL 02/09/2021    Influenza Virus Vaccine 09/24/2014, 09/24/2015    Influenza Whole 11/09/2011    Influenza, Quadv, IM, PF (6 mo and older Fluzone, Flulaval, Fluarix, and 3 yrs and older Afluria) 09/25/2013, 09/25/2014, 10/13/2016, 10/31/2017, 09/21/2018    Influenza, Quadv, adjuvanted, 65 yrs +, IM, PF (Fluad) 02/22/2021    Pneumococcal Polysaccharide ([id number]) 02/22/2021    Tdap (Boostrix, Adacel) 06/09/2015       Allergies   Allergen Reactions    Dye [Iodides] Hives     No outpatient medications have been marked as taking for the 2/22/21 encounter (Office Visit) with Inez Mcgraw MD.       No past medical history on file. Past Surgical History:   Procedure Laterality Date    APPENDECTOMY      PAROTIDECTOMY      partial    TONSILLECTOMY       Family History   Problem Relation Age of Onset    High Cholesterol Sister     Ovarian Cancer Mother     Ovarian Cancer Maternal Aunt          Review of Systems:  A comprehensive review of systems was negative except for what was noted in the HPI. Physical Exam:   Vitals:    02/22/21 0818   BP: 116/72   Pulse: 79   Temp: 97.1 °F (36.2 °C)   TempSrc: Infrared   SpO2: 95%   Weight: 201 lb 6.4 oz (91.4 kg)   Height: 5' 7.75\" (1.721 m)     Body mass index is 30.85 kg/m². Vitals:    02/22/21 0818   BP: 116/72   Pulse: 79   Temp: 97.1 °F (36.2 °C)   TempSrc: Infrared   SpO2: 95%   Weight: 201 lb 6.4 oz (91.4 kg)   Height: 5' 7.75\" (1.721 m)     Body mass index is 30.85 kg/m². Constitutional: She is oriented to person, place, and time. She appears well-developed and well-nourished. No distress. HEENT:   Head: Normocephalic and atraumatic. Right Ear: Tympanic membrane, external ear and ear canal normal.   Left Ear: Tympanic membrane, external ear and ear canal normal.   Nose: Nose normal.   Mouth/Throat: Oropharynx is clear and moist, and mucous membranes are normal.  There is no cervical adenopathy. Eyes: Conjunctivae and extraocular motions are normal. Pupils are equal, round, and reactive to light. Neck: Neck supple. No JVD present. Carotid bruit is not present. No mass and no thyromegaly present.    Cardiovascular: Normal rate, regular rhythm, normal heart sounds and intact distal pulses. Exam reveals no gallop and no friction rub. No murmur heard. Pulmonary/Chest: Effort normal and breath sounds normal. No respiratory distress. She has no wheezes, rhonchi or rales. Abdominal: Soft, non-tender. Bowel sounds and aorta are normal. She exhibits no organomegaly, mass or bruit. Genitourinary: examination not indicated. Breast exam:  breasts appear normal, no suspicious masses, no skin or nipple changes or axillary nodes. Musculoskeletal: Normal range of motion, no synovitis. She exhibits no edema. Neurological: She is alert and oriented to person, place, and time. She has normal reflexes. No cranial nerve deficit. Coordination normal.   Skin: Skin is warm and dry. There is no rash or erythema. No suspicious lesions noted. Psychiatric: She has a normal mood and affect.  Her speech is normal and behavior is normal. Judgment, cognition and memory are normal.       Wt Readings from Last 3 Encounters:   02/22/21 201 lb 6.4 oz (91.4 kg)   09/21/18 197 lb 12.8 oz (89.7 kg)     BP Readings from Last 3 Encounters:   02/22/21 116/72   09/21/18 120/70          The 10-year ASCVD risk score (Katya Tolentino, et al., 2013) is: 5%    Values used to calculate the score:      Age: 72 years      Sex: Female      Is Non- : No      Diabetic: No      Tobacco smoker: No      Systolic Blood Pressure: 595 mmHg      Is BP treated: No      HDL Cholesterol: 63 mg/dL      Total Cholesterol: 261 mg/dL        Lab Review: not applicable     Assessment/Plan:    Mildred Bowles was seen today for annual exam.    Diagnoses and all orders for this visit:    Annual physical exam  -     [facility] Screening Bilateral  -     Hemoglobin A1C  -     Lipid Panel  -     PNEUMOVAX 23 subcutaneous/IM (Pneumococcal polysaccharide vaccine 23-valent >= 1yo)  -     INFLUENZA, QUADV, ADJUVANTED, 65 YRS =, IM, PF, PREFILL SYR, 0.5ML (FLUAD)    Post-menopausal  -     DEXA BONE DENSITY AXIAL SKELETON; Future    Prediabetes    Need for shingles vaccine    Screen for colon cancer    Need for 23-polyvalent pneumococcal polysaccharide vaccine  -     PNEUMOVAX 23 subcutaneous/IM (Pneumococcal polysaccharide vaccine 23-valent >= 3yo)    Needs flu shot  -     INFLUENZA, QUADV, ADJUVANTED, 72 YRS =, IM, PF, PREFILL SYR, 0.5ML (FLUAD)    Family history of ovarian cancer    Encounter for screening mammogram for malignant neoplasm of breast  -     HEAVENLY Screening Bilateral    Screening cholesterol level  -     Lipid Panel    Screening for diabetes mellitus  -     Hemoglobin A1C      Get shingles vaccine    Patient is due for colon cancer screening. .  Is preferring to have stool testing done rather than get a colonoscopy. The patient does not have a family history of colon cancer and does not have bleeding when they have bowel movements. Therefore, fit test  was given to patient. The patient will be contacted in 2 weeks if the fit test has not been returned. If the fit test is positive, then the patient will be referred for a colonoscopy. Goal of 5 vegetables and fruits per day or half the plate be vegetable and fruits  2-3 serving of dairy per day. 30 minutes of walking per day or 2 1/2 hours per week of walking or 8,000 steps     Patient does not have > 7.5% cardiac risk . Baby ASA needed?   No    Per USPTF guidelines, patient is  needing mammogram at this time

## 2021-02-23 PROBLEM — E78.49 OTHER HYPERLIPIDEMIA: Status: ACTIVE | Noted: 2021-02-23

## 2021-02-24 DIAGNOSIS — E78.5 HYPERLIPIDEMIA, UNSPECIFIED HYPERLIPIDEMIA TYPE: Primary | ICD-10-CM

## 2021-02-24 RX ORDER — ATORVASTATIN CALCIUM 10 MG/1
10 TABLET, FILM COATED ORAL DAILY
Qty: 30 TABLET | Refills: 1 | Status: SHIPPED | OUTPATIENT
Start: 2021-02-24 | End: 2021-05-28 | Stop reason: SDUPTHER

## 2021-04-12 ENCOUNTER — TELEPHONE (OUTPATIENT)
Dept: FAMILY MEDICINE CLINIC | Age: 66
End: 2021-04-12

## 2021-04-12 NOTE — TELEPHONE ENCOUNTER
Pharmacy has already filled this script, script should be ready for .  Tried to call patient left a VM telling her to return call

## 2021-04-15 NOTE — PROGRESS NOTES
Patient stated she lost her fit card and will come get another one either today( 4/15/201)  or Friday (04/16/2021)

## 2021-05-24 ENCOUNTER — HOSPITAL ENCOUNTER (OUTPATIENT)
Age: 66
Discharge: HOME | End: 2021-05-24
Payer: MEDICARE

## 2021-05-24 DIAGNOSIS — E78.5 HYPERLIPIDEMIA, UNSPECIFIED HYPERLIPIDEMIA TYPE: ICD-10-CM

## 2021-05-24 LAB
ALT SERPL-CCNC: 23 U/L (ref 10–40)
AST SERPL-CCNC: 26 IU/L (ref 15–37)

## 2021-05-24 PROCEDURE — 84450 TRANSFERASE (AST) (SGOT): CPT

## 2021-05-24 PROCEDURE — 83721 ASSAY OF BLOOD LIPOPROTEIN: CPT

## 2021-05-24 PROCEDURE — 84460 ALANINE AMINO (ALT) (SGPT): CPT

## 2021-05-24 PROCEDURE — 80061 LIPID PANEL: CPT

## 2021-05-24 PROCEDURE — 36415 COLL VENOUS BLD VENIPUNCTURE: CPT

## 2021-05-25 DIAGNOSIS — E78.5 HYPERLIPIDEMIA, UNSPECIFIED HYPERLIPIDEMIA TYPE: Primary | ICD-10-CM

## 2021-05-26 LAB
CHOLESTEROL: 151 MG/DL
HDLC SERPL-MCNC: 57 MG/DL
LDL CHOLESTEROL DIRECT: 86 MG/DL
TRIGL SERPL-MCNC: 84 MG/DL

## 2021-05-28 ENCOUNTER — VIRTUAL VISIT (OUTPATIENT)
Dept: FAMILY MEDICINE CLINIC | Age: 66
End: 2021-05-28
Payer: MEDICARE

## 2021-05-28 DIAGNOSIS — E78.5 HYPERLIPIDEMIA, UNSPECIFIED HYPERLIPIDEMIA TYPE: ICD-10-CM

## 2021-05-28 PROCEDURE — 99213 OFFICE O/P EST LOW 20 MIN: CPT | Performed by: FAMILY MEDICINE

## 2021-05-28 PROCEDURE — G8427 DOCREV CUR MEDS BY ELIG CLIN: HCPCS | Performed by: FAMILY MEDICINE

## 2021-05-28 PROCEDURE — 1123F ACP DISCUSS/DSCN MKR DOCD: CPT | Performed by: FAMILY MEDICINE

## 2021-05-28 PROCEDURE — G8400 PT W/DXA NO RESULTS DOC: HCPCS | Performed by: FAMILY MEDICINE

## 2021-05-28 PROCEDURE — 4040F PNEUMOC VAC/ADMIN/RCVD: CPT | Performed by: FAMILY MEDICINE

## 2021-05-28 PROCEDURE — 1090F PRES/ABSN URINE INCON ASSESS: CPT | Performed by: FAMILY MEDICINE

## 2021-05-28 PROCEDURE — 3017F COLORECTAL CA SCREEN DOC REV: CPT | Performed by: FAMILY MEDICINE

## 2021-05-28 RX ORDER — MINOCYCLINE HYDROCHLORIDE 100 MG/1
CAPSULE ORAL
COMMUNITY
Start: 2021-05-22 | End: 2022-02-28

## 2021-05-28 RX ORDER — ATORVASTATIN CALCIUM 10 MG/1
10 TABLET, FILM COATED ORAL DAILY
Qty: 90 TABLET | Refills: 3 | Status: SHIPPED | OUTPATIENT
Start: 2021-05-28 | End: 2022-02-28 | Stop reason: ALTCHOICE

## 2021-05-28 NOTE — PROGRESS NOTES
[x] No visualized signs of difficulty breathing or respiratory distress        [] Abnormal-      Musculoskeletal:   [x] Normal gait with no signs of ataxia         [] Normal range of motion of neck        [] Abnormal-       Neurological:        [x] No Facial Asymmetry (Cranial nerve 7 motor function) (limited exam to video visit)          [] No gaze palsy        [] Abnormal-         Skin:        [x] No significant exanthematous lesions or discoloration noted on facial skin         [] Abnormal-            Psychiatric:       [x] Normal Affect [x] No Hallucinations        [] Abnormal-     Other pertinent observable physical exam findings-     Lab Review   Hospital Outpatient Visit on 05/24/2021   Component Date Value    ALT 05/24/2021 23     AST 05/24/2021 26     Triglycerides 05/24/2021 84     Cholesterol 05/24/2021 151     HDL 05/24/2021 57     LDL Direct 05/24/2021 86           Diagnosis Orders   1. Hyperlipidemia, unspecified hyperlipidemia type  atorvastatin (LIPITOR) 10 MG tablet     Cholesterol much improved with diet and exercise. Continue with current treatment for now    Recheck next February. If she maintains her exercise and her weight loss, she may stop the Lipitor 6 weeks prior to her next visit and we can recheck it. She understands      Patient is due for colon cancer screening. .  Is preferring to have stool testing done rather than get a colonoscopy. The patient does not have a family history of colon cancer and does not have bleeding when they have bowel movements. Therefore, fit test  was given to patient. The patient will be contacted in 2 weeks if the fit test has not been returned. If the fit test is positive, then the patient will be referred for a colonoscopy. No follow-ups on file. Aldairrola Alex, was evaluated through a synchronous (real-time) audio-video encounter. The patient (or guardian if applicable) is aware that this is a billable service.  Verbal consent to proceed has been obtained within the past 12 months. The visit was conducted pursuant to the emergency declaration under the [address] [place] [address] and the [employer] General Act. Patient identification was verified, and a caregiver was present when appropriate. The patient was located in a state where the provider was credentialed to provide care. Total time spent on this encounter: Not billed by time    --Martha Cheema MD on 5/28/2021 at 8:25 AM    An electronic signature was used to authenticate this note.

## 2021-09-21 ENCOUNTER — TELEPHONE (OUTPATIENT)
Dept: FAMILY MEDICINE CLINIC | Age: 66
End: 2021-09-21

## 2021-09-21 ENCOUNTER — NURSE ONLY (OUTPATIENT)
Dept: FAMILY MEDICINE CLINIC | Age: 66
End: 2021-09-21
Payer: MEDICARE

## 2021-09-21 DIAGNOSIS — Z23 NEED FOR INFLUENZA VACCINATION: Primary | ICD-10-CM

## 2021-09-21 PROCEDURE — 90694 VACC AIIV4 NO PRSRV 0.5ML IM: CPT | Performed by: FAMILY MEDICINE

## 2021-09-21 PROCEDURE — G0008 ADMIN INFLUENZA VIRUS VAC: HCPCS | Performed by: FAMILY MEDICINE

## 2021-09-21 NOTE — TELEPHONE ENCOUNTER
----- Message from Mary Landeros sent at 9/21/2021 12:03 PM EDT -----  Subject: Refill Request    QUESTIONS  Name of Medication? atorvastatin (LIPITOR) 10 MG tablet  Patient-reported dosage and instructions? 1 tablet once daily  How many days do you have left? 0  Preferred Pharmacy? [id number]  Pharmacy phone number (if available)? [phone]  Additional Information for Provider? Patient is requesting additional   refills  ---------------------------------------------------------------------------  --------------  CALL BACK INFO  What is the best way for the office to contact you? OK to leave message on   voicemail  Preferred Call Back Phone Number?  [phone]

## 2021-09-21 NOTE — TELEPHONE ENCOUNTER
----- Message from Liyah Vega sent at 9/21/2021 12:00 PM EDT -----  Subject: Appointment Request    Reason for Call: Flu Shot    QUESTIONS  Type of Appointment? Established Patient  Reason for appointment request? No appointments available during search  Additional Information for Provider? Patient is calling to schedule her   flu shot. Please call back to schedule.   ---------------------------------------------------------------------------  --------------  CALL BACK INFO  What is the best way for the office to contact you? OK to leave message on   voicemail  Preferred Call Back Phone Number? [phone]  ---------------------------------------------------------------------------  --------------  SCRIPT ANSWERS  Relationship to Patient? Self   Is the Adult patient requesting a Flu Shot? Yes  Is the parent/patient requesting a Flu Shot for a child older than 6   months? No  Does the patient have a question for their provider regarding the flu   shot? No  Have you been diagnosed with, awaiting test results for, or told that you   are suspected of having COVID-19 (Coronavirus)? (If patient has tested   negative or was tested as a requirement for work, school, or travel and   not based on symptoms, answer no)? No  Within the past two weeks have you developed any of the following symptoms   (answer no if symptoms have been present longer than 2 weeks or began   more than 2 weeks ago)? Fever or Chills, Cough, Shortness of breath or   difficulty breathing, Loss of taste or smell, Sore throat, Nasal   congestion, Sneezing or runny nose, Fatigue or generalized body aches   (answer no if pain is specific to a body part e.g. back pain), Diarrhea,   Headache? No  Have you had close contact with someone with COVID-19 in the last 14 days? No  (Service Expert  click yes below to proceed with Forter As Usual   Scheduling)?  Yes

## 2021-11-29 ENCOUNTER — TELEPHONE (OUTPATIENT)
Dept: FAMILY MEDICINE CLINIC | Age: 66
End: 2021-11-29

## 2021-11-29 NOTE — TELEPHONE ENCOUNTER
It looks as if I never got back to her on her results. The cholesterol results show that her medicine is working very well. The cholesterol is low. The plan at this time would be to continue the atorvastatin for now. No new blood work is needed at this time. Review of chart indicates that she is due for her mammogram and bone density. These tests were ordered in the winter. I encouraged her to get these done as soon as possible    Review of chart indicates that she is due for an annual wellness visit. Please see if we can add this onto her next office visit. Add an extra 15 minutes. Review of chart also indicates that she did not bring back her fit test.  Can she bring that back in the next few days.

## 2021-11-30 NOTE — TELEPHONE ENCOUNTER
Patient was notified     Patient stated the fit test is not going to happened, I told patient it was very important that she does the fit test and bring it back, Patient stated \" yeah that's probably not Authur Silvan happened but I will try\"

## 2022-02-21 ENCOUNTER — TELEPHONE (OUTPATIENT)
Dept: FAMILY MEDICINE CLINIC | Age: 67
End: 2022-02-21

## 2022-02-21 DIAGNOSIS — Z12.39 SCREENING BREAST EXAMINATION: Primary | ICD-10-CM

## 2022-02-21 NOTE — TELEPHONE ENCOUNTER
Mammogram department called stating the patient is needing a screening mammogram done, since the patient is getting her Bone Density done tomorrow they would like to go ahead and have her do the screening mammogram as well. Please place the order in Epic so the patient can have this done.  Please advise     Thank you

## 2022-02-22 ENCOUNTER — HOSPITAL ENCOUNTER (OUTPATIENT)
Dept: WOMENS IMAGING | Age: 67
Discharge: HOME | End: 2022-02-22
Payer: MEDICARE

## 2022-02-22 DIAGNOSIS — Z78.0 POST-MENOPAUSAL: ICD-10-CM

## 2022-02-22 DIAGNOSIS — Z12.31 SCREENING MAMMOGRAM, ENCOUNTER FOR: ICD-10-CM

## 2022-02-22 PROCEDURE — 77063 BREAST TOMOSYNTHESIS BI: CPT

## 2022-02-22 PROCEDURE — 77080 DXA BONE DENSITY AXIAL: CPT

## 2022-02-28 ENCOUNTER — OFFICE VISIT (OUTPATIENT)
Dept: FAMILY MEDICINE CLINIC | Age: 67
End: 2022-02-28
Payer: MEDICARE

## 2022-02-28 VITALS
DIASTOLIC BLOOD PRESSURE: 80 MMHG | WEIGHT: 183 LBS | HEIGHT: 68 IN | BODY MASS INDEX: 27.74 KG/M2 | SYSTOLIC BLOOD PRESSURE: 120 MMHG | OXYGEN SATURATION: 99 % | HEART RATE: 53 BPM

## 2022-02-28 DIAGNOSIS — M85.852 OSTEOPENIA OF LEFT HIP: ICD-10-CM

## 2022-02-28 DIAGNOSIS — E66.3 OVERWEIGHT (BMI 25.0-29.9): ICD-10-CM

## 2022-02-28 DIAGNOSIS — Z23 NEED FOR SHINGLES VACCINE: ICD-10-CM

## 2022-02-28 DIAGNOSIS — Z00.00 ANNUAL PHYSICAL EXAM: Primary | ICD-10-CM

## 2022-02-28 DIAGNOSIS — Z12.11 SCREEN FOR COLON CANCER: ICD-10-CM

## 2022-02-28 DIAGNOSIS — R73.03 PREDIABETES: ICD-10-CM

## 2022-02-28 DIAGNOSIS — E78.49 OTHER HYPERLIPIDEMIA: ICD-10-CM

## 2022-02-28 LAB
CHOLESTEROL, TOTAL: 204 MG/DL (ref 0–199)
HBA1C MFR BLD: 5.7 %
HDLC SERPL-MCNC: 71 MG/DL (ref 40–60)
LDL CHOLESTEROL CALCULATED: 118 MG/DL
TRIGL SERPL-MCNC: 74 MG/DL (ref 0–150)
VLDLC SERPL CALC-MCNC: 15 MG/DL

## 2022-02-28 PROCEDURE — 83036 HEMOGLOBIN GLYCOSYLATED A1C: CPT | Performed by: FAMILY MEDICINE

## 2022-02-28 PROCEDURE — 99397 PER PM REEVAL EST PAT 65+ YR: CPT | Performed by: FAMILY MEDICINE

## 2022-02-28 PROCEDURE — G8484 FLU IMMUNIZE NO ADMIN: HCPCS | Performed by: FAMILY MEDICINE

## 2022-02-28 PROCEDURE — 36415 COLL VENOUS BLD VENIPUNCTURE: CPT | Performed by: FAMILY MEDICINE

## 2022-02-28 RX ORDER — CAL/D3/MAG11/ZINC/COP/MANG/BOR 600 MG-800
1 TABLET ORAL 2 TIMES DAILY
Qty: 60 TABLET | Refills: 11 | Status: SHIPPED | OUTPATIENT
Start: 2022-02-28

## 2022-02-28 ASSESSMENT — PATIENT HEALTH QUESTIONNAIRE - PHQ9
7. TROUBLE CONCENTRATING ON THINGS, SUCH AS READING THE NEWSPAPER OR WATCHING TELEVISION: 0
SUM OF ALL RESPONSES TO PHQ QUESTIONS 1-9: 3
5. POOR APPETITE OR OVEREATING: 0
3. TROUBLE FALLING OR STAYING ASLEEP: 0
4. FEELING TIRED OR HAVING LITTLE ENERGY: 0
SUM OF ALL RESPONSES TO PHQ9 QUESTIONS 1 & 2: 3
8. MOVING OR SPEAKING SO SLOWLY THAT OTHER PEOPLE COULD HAVE NOTICED. OR THE OPPOSITE, BEING SO FIGETY OR RESTLESS THAT YOU HAVE BEEN MOVING AROUND A LOT MORE THAN USUAL: 0
2. FEELING DOWN, DEPRESSED OR HOPELESS: 3
SUM OF ALL RESPONSES TO PHQ QUESTIONS 1-9: 3
1. LITTLE INTEREST OR PLEASURE IN DOING THINGS: 0
9. THOUGHTS THAT YOU WOULD BE BETTER OFF DEAD, OR OF HURTING YOURSELF: 0
SUM OF ALL RESPONSES TO PHQ QUESTIONS 1-9: 3
6. FEELING BAD ABOUT YOURSELF - OR THAT YOU ARE A FAILURE OR HAVE LET YOURSELF OR YOUR FAMILY DOWN: 0
10. IF YOU CHECKED OFF ANY PROBLEMS, HOW DIFFICULT HAVE THESE PROBLEMS MADE IT FOR YOU TO DO YOUR WORK, TAKE CARE OF THINGS AT HOME, OR GET ALONG WITH OTHER PEOPLE: 0
SUM OF ALL RESPONSES TO PHQ QUESTIONS 1-9: 3

## 2022-02-28 NOTE — PROGRESS NOTES
History and Physical      Kelsey Small  YOB: 1955    Date of Service:  2/28/2022    Chief Complaint:   Kelsey Small is a 77 y.o. female who presents for complete physical examination. HPI: Here for physical    PHQ Scores 2/28/2022 2/22/2021 9/21/2018   PHQ2 Score 3 0 0   PHQ9 Score 3 0 0     Interpretation of Total Score Depression Severity: 1-4 = Minimal depression, 5-9 = Mild depression, 10-14 = Moderate depression, 15-19 = Moderately severe depression, 20-27 = Severe depression    Hx abnormal PAP: no  Sexual activity: single partner, contraception - post menopausal status   Alcohol rare  Exercise: frequent  Seatbelt use:Yes  Sees dentist every 6 months: yes  Intimate partner violence? No  Substance abuse? no    Wt Readings from Last 3 Encounters:   02/28/22 183 lb (83 kg)   02/22/21 201 lb 6.4 oz (91.4 kg)   09/21/18 197 lb 12.8 oz (89.7 kg)     BP Readings from Last 3 Encounters:   02/28/22 120/80   02/22/21 116/72   09/21/18 120/70       Patient Active Problem List   Diagnosis    Obesity (BMI 30.0-34. 9)    Family history of ovarian cancer    Rosacea    Prediabetes    Other hyperlipidemia    Osteopenia of left hip       Preventive Care:  Health Maintenance   Topic Date Due    Depression Screen  Never done    Colorectal Cancer Screen  Never done    Shingles Vaccine (1 of 2) Never done   [employer] Visit (AWV)  Never done    COVID-19 Vaccine (3 - Booster for Moderna series) 08/09/2021    Lipid screen  05/24/2022    Breast cancer screen  02/22/2023    A1C test (Diabetic or Prediabetic)  02/28/2023    DTaP/Tdap/Td vaccine (2 - Td or Tdap) 06/09/2025    DEXA (modify frequency per FRAX score)  Completed    Flu vaccine  Completed    Pneumococcal 65+ years Vaccine  Completed    Hepatitis C screen  Completed    Hepatitis A vaccine  Aged Out    Hepatitis B vaccine  Aged Out    Hib vaccine  Aged Out    Meningococcal (ACWY) vaccine  Aged Out      Lipid panel:   Lab Results   Component Value Date    CHOL 151 05/24/2021    TRIG 84 05/24/2021    HDL 57 05/24/2021    LDLCALC 173 (H) 02/22/2021    LDLDIRECT 86 05/24/2021          Immunization History   Administered Date(s) Administered    COVID-19, Moderna, Primary or Immunocompromised, PF, 100mcg/0.5mL 02/09/2021, 03/09/2021    Influenza Virus Vaccine 09/24/2014, 09/24/2015    Influenza Whole 11/09/2011    Influenza, Sawyer Cooper, IM, PF (6 mo and older Fluzone, Flulaval, Fluarix, and 3 yrs and older Afluria) 09/25/2013, 09/25/2014, 10/13/2016, 10/31/2017, 09/21/2018    Influenza, Sawyer Cooper, adjuvanted, 65 yrs +, IM, PF (Fluad) 02/22/2021, 09/21/2021    Pneumococcal Polysaccharide ([id number]) 02/22/2021    Tdap (Boostrix, Adacel) 06/09/2015         Outpatient Medications Marked as Taking for the 2/28/22 encounter (Office Visit) with Suman Sanchez MD   Medication Sig Dispense Refill    Caltrate 600+D Plus Minerals (CALTRATE) 600-800 MG-UNIT TABS tablet Take 1 tablet by mouth 2 times daily 60 tablet 11    atorvastatin (LIPITOR) 10 MG tablet Take 1 tablet by mouth daily 90 tablet 3    metroNIDAZOLE (METROGEL) 1 % gel Apply topically 2 times daily 15 g 2       No past medical history on file. Past Surgical History:   Procedure Laterality Date    APPENDECTOMY      PAROTIDECTOMY      partial    TONSILLECTOMY       Family History   Problem Relation Age of Onset    High Cholesterol Sister     Ovarian Cancer Mother     Ovarian Cancer Maternal Aunt          Review of Systems:      Physical Exam:       Vitals:    02/28/22 0834   BP: 120/80   Site: Left Upper Arm   Position: Sitting   Cuff Size: Medium Adult   Pulse: 53   SpO2: 99%   Weight: 183 lb (83 kg)   Height: 5' 7.5\" (1.715 m)       Body mass index is 28.24 kg/m². Physical Exam  Vitals and nursing note reviewed. Constitutional:       General: She is not in acute distress. Appearance: Normal appearance. She is well-developed.    HENT:      Head: Normocephalic and atraumatic. Right Ear: Hearing, tympanic membrane and external ear normal.      Left Ear: Hearing, tympanic membrane and external ear normal.      Nose: Nose normal. No nasal deformity, laceration, mucosal edema or rhinorrhea. Right Sinus: No maxillary sinus tenderness or frontal sinus tenderness. Left Sinus: No maxillary sinus tenderness or frontal sinus tenderness. Mouth/Throat:      Mouth: Mucous membranes are moist.      Pharynx: No oropharyngeal exudate or posterior oropharyngeal erythema. Eyes:      Conjunctiva/sclera: Conjunctivae normal.      Pupils: Pupils are equal, round, and reactive to light. Neck:      Thyroid: No thyromegaly. Trachea: No tracheal deviation. Cardiovascular:      Rate and Rhythm: Normal rate and regular rhythm. Heart sounds: Normal heart sounds, S1 normal and S2 normal. No friction rub. No gallop. Pulmonary:      Effort: Pulmonary effort is normal. No respiratory distress. Breath sounds: Normal breath sounds. No wheezing or rales. Chest:   Breasts: Breasts are symmetrical.      Right: No inverted nipple, mass, nipple discharge, skin change or tenderness. Left: No inverted nipple, mass, nipple discharge, skin change or tenderness. Abdominal:      General: There is no distension. Palpations: Abdomen is soft. There is no hepatomegaly, splenomegaly or mass. Tenderness: There is no abdominal tenderness. Musculoskeletal:      Cervical back: Neck supple. Right lower leg: No edema. Left lower leg: No edema. Lymphadenopathy:      Head:      Right side of head: No submental, submandibular or posterior auricular adenopathy. Left side of head: No submental, submandibular or posterior auricular adenopathy. Cervical:      Right cervical: No superficial, deep or posterior cervical adenopathy. Left cervical: No superficial, deep or posterior cervical adenopathy. Skin:     General: Skin is warm and dry. Coloration: Skin is not jaundiced. Neurological:      General: No focal deficit present. Mental Status: She is alert and oriented to person, place, and time. Mental status is at baseline. Cranial Nerves: Cranial nerves are intact. Sensory: Sensation is intact. Motor: Motor function is intact. Gait: Gait is intact. Psychiatric:         Attention and Perception: She is attentive. Speech: Speech normal.         Behavior: Behavior normal.         Thought Content:  Thought content normal.         Judgment: Judgment normal.          Wt Readings from Last 3 Encounters:   02/28/22 183 lb (83 kg)   02/22/21 201 lb 6.4 oz (91.4 kg)   09/21/18 197 lb 12.8 oz (89.7 kg)     BP Readings from Last 3 Encounters:   02/28/22 120/80   02/22/21 116/72   09/21/18 120/70          The 10-year ASCVD risk score (Iglesia Barger, et al., 2013) is: 4.7%    Values used to calculate the score:      Age: 77 years      Sex: Female      Is Non- : No      Diabetic: No      Tobacco smoker: No      Systolic Blood Pressure: 710 mmHg      Is BP treated: No      HDL Cholesterol: 57 MG/DL      Total Cholesterol: 151 MG/DL    EXAMINATION:   BONE DENSITOMETRY       2/22/2022 12:11 pm       TECHNIQUE:   A bone density dual x-ray absorptiometry (DEXA) scan was performed of the   lumbar spine and left hip  on a [employer] system.       COMPARISON:   None.       HISTORY:   ORDERING SYSTEM PROVIDED HISTORY: Post-menopausal       FINDINGS:   LUMBAR SPINE:       The bone mineral density in the lumbar spine including the L1 through L4   levels is measured at 1.115 g/cm2, which corresponds to a T-score of -0.5 and   a Z-score of 0.5.  This is within the normal range by WHO criteria.       LEFT HIP:       The bone mineral density in the total hip is measured at 0.870 g/cm2   corresponding to a T-score of -1.1 and a Z-score of -0.3.  This is within the   osteopenia range by WHO criteria.       The bone mineral density of the femoral neck is measured at 0.811 g/cm2   corresponding to a T-score of -1.6 and a Z-score of -0.5.  This is within the   osteopenia range by WHO criteria.       10 year fracture risk is performed using the [facility] FRAX   calculator based on patient-reported risk factors.       Major osteoporotic fracture: 9.5%       Hip fracture: 1.2%       National Osteoporosis Foundation guidelines suggest pharmacologic treatment   of patients with major osteoporotic fracture risk of 20% or greater and hip   fracture risk of 3% or greater.           Other situations known to alter the reliability of the FRAX score should be   considered when making treatment decisions, including chronic glucocorticoid   use and past treatments.  Further guidance on treatment can be found at the   [facility] Osteoporosis Foundation's website [phone]. Lab Review:   No visits with results within 2 Month(s) from this visit. Latest known visit with results is:   Hospital Outpatient Visit on 05/24/2021   Component Date Value    ALT 05/24/2021 23     AST 05/24/2021 26     Triglycerides 05/24/2021 84     Cholesterol 05/24/2021 151     HDL 05/24/2021 57     LDL Direct 05/24/2021 86         Assessment/Plan:    Sabra Bolanos was seen today for annual exam, other and medication refill. Diagnoses and all orders for this visit:    Annual physical exam  -     Cancel: Hemoglobin A1C  -     Lipid Panel    Prediabetes  -     Cancel: Hemoglobin A1C  -     POCT glycosylated hemoglobin (Hb A1C)    Other hyperlipidemia  -     Lipid Panel    Osteopenia of left hip  -     Caltrate 600+D Plus Minerals (CALTRATE) 600-800 MG-UNIT TABS tablet; Take 1 tablet by mouth 2 times daily    Screen for colon cancer  -     [facility]    Need for shingles vaccine    Overweight (BMI 25.0-29. 9)        Goal of 5 vegetables and fruits per day or half the plate be vegetable and fruits  2-3 serving of dairy per day.     30 minutes of walking per day or 2 1/2 hours per week of walking or 8,000 steps       Per USPSTF guidelines, patient is not needing mammogram at this time

## 2022-02-28 NOTE — PATIENT INSTRUCTIONS
PLEASE BRING YOUR MEDICATIONS TO ALL APPOINTMENTS      Please check MY CHART for test results. If you have forgotten your password, please call [phone]. The diagnoses and medications listed in this after visit summary may not be up to date. Please check MY CHART in 28-48 hours for possible corrections. Late cancellation policy: So that we can better accommodate people who are sick, please give our office 24 hour notice for an appointment cancellation. Missed appointments: Your care is very important to us. It is important that you keep your scheduled appointments. Multiple missed appointments will lead to a dismissal from the office. Patients arriving late will be worked into the schedule as time permits, with patients arriving on time taken as scheduled. Late arriving patients are more than welcome to wait or reschedule their appointments. Please allow 5-7 business days for paperwork to be processed. HERE ARE SOME LIFE CHANGING TIPS      1. Take your blood pressure medications at bedtime to reduce your chance of heart attack or stroke  2. Fever in kids:  Give both Tylenol and Ibuprofen at the same time rather than staggering them   3. Follow these tips to reduce childhood obesity: Reduce unnecessary exposure to antibiotics, consume whole milk instead of skim milk, watch public TV instead of regular TV (less exposure to junk food commercials), and reduce traumatic experiences. 4. 1 egg per day is good for your heart  5. Alternate day fasting does promote weight loss. Skipping breakfast increases your risk of obesity  6. Artificially sweetened drinks increase all cause mortality (strokes, body mass index, cardiovascular disease)  7. Kale consumption can reduce onset of dementia  8. Walking at least 8000 steps per day and resistance exercise 2-3 x per week are good for your heart  9. Brushing teeth 3 times per day can decrease chance of getting diabetes  10.  Antibiotic use is associated with a lifetime increased risk of breast cancer and heart disease. Patient Education        Preventing Osteoporosis: Care Instructions  Your Care Instructions     Osteoporosis means the bones are weak and thin enough that they can break easily. The older you are, the more likely you are to get osteoporosis. But with plenty of calcium, vitamin D, and exercise, you can help prevent osteoporosis. The preteen and teen years are a key time for bone building. With the help of calcium, vitamin D, and exercise in those early years and beyond, the bones reach their peak density and strength by age 27. After age 27, your bones naturally start to thin and weaken. The stronger your bones are at around age 27, the lower your risk for osteoporosis. But no matter what your age and risk are, your bones still need calcium, vitamin D, and exercise to stay strong. Also avoid smoking, and limit alcohol. Smoking and heavy alcohol use can make your bones thinner. Talk to your doctor about any special risks you might have, such as having a close relative with osteoporosis or taking a medicine that can weaken bones. Your doctor can tell you the best ways to protect your bones from thinning. Follow-up care is a key part of your treatment and safety. Be sure to make and go to all appointments, and call your doctor if you are having problems. It's also a good idea to know your test results and keep a list of the medicines you take. How can you care for yourself at home? Here are some things you can do to build and strengthen your bones:  · Get enough calcium and vitamin D.  ? Eat foods rich in calcium, like yogurt, cheese, milk, and dark green vegetables. ? Eat foods rich in vitamin D, like eggs, fatty fish, cereal, and fortified milk. ? Get some sunshine.  Your body uses sunshine to make its own vitamin D.  ? Talk to your doctor about taking a calcium plus vitamin D supplement if you don't think you are getting enough through your diet and sunshine. · Get regular bone-building exercise. Walking, jogging, dancing, and lifting weights can make your bones stronger. · Limit alcohol to 2 drinks a day for men and 1 drink a day for women. · Don't smoke. Smoking can make bones thin faster. If you need help quitting, talk to your doctor about stop-smoking programs and medicines. These can increase your chances of quitting for good. When should you call for help? Watch closely for changes in your health, and be sure to contact your doctor if you have any problems. Where can you learn more? Go to [email or web address].com.aupepiceweb.[email or web address]. org and sign in to your Exercise.com account. Enter N397 in the Arganteal box to learn more about \"Preventing Osteoporosis: Care Instructions. \"     If you do not have an account, please click on the \"Sign Up Now\" link. Current as of: September 8, 2021               Content Version: 13.1  © 2006-2021 Healthwise, Incorporated. Care instructions adapted under license by [facility] [address] If you have questions about a medical condition or this instruction, always ask your healthcare professional. [place] [address] any warranty or liability for your use of this information.

## 2022-03-01 ENCOUNTER — TELEPHONE (OUTPATIENT)
Dept: GASTROENTEROLOGY | Age: 67
End: 2022-03-01

## 2022-03-01 NOTE — TELEPHONE ENCOUNTER
Called pt. In regards to a referral for a colon screening.  Made appt for pt to see donavon on 3/9/22 @4pm

## 2022-04-20 ENCOUNTER — TELEMEDICINE (OUTPATIENT)
Dept: FAMILY MEDICINE CLINIC | Age: 67
End: 2022-04-20
Payer: MEDICARE

## 2022-04-20 DIAGNOSIS — Z00.00 INITIAL MEDICARE ANNUAL WELLNESS VISIT: Primary | ICD-10-CM

## 2022-04-20 PROCEDURE — 1123F ACP DISCUSS/DSCN MKR DOCD: CPT | Performed by: FAMILY MEDICINE

## 2022-04-20 PROCEDURE — G0438 PPPS, INITIAL VISIT: HCPCS | Performed by: FAMILY MEDICINE

## 2022-04-20 PROCEDURE — 4040F PNEUMOC VAC/ADMIN/RCVD: CPT | Performed by: FAMILY MEDICINE

## 2022-04-20 PROCEDURE — 3017F COLORECTAL CA SCREEN DOC REV: CPT | Performed by: FAMILY MEDICINE

## 2022-04-20 RX ORDER — M-VIT,TX,IRON,MINS/CALC/FOLIC 27MG-0.4MG
1 TABLET ORAL DAILY
COMMUNITY

## 2022-04-20 RX ORDER — ASCORBIC ACID 500 MG
500 TABLET ORAL DAILY
COMMUNITY

## 2022-04-20 SDOH — ECONOMIC STABILITY: FOOD INSECURITY: WITHIN THE PAST 12 MONTHS, YOU WORRIED THAT YOUR FOOD WOULD RUN OUT BEFORE YOU GOT MONEY TO BUY MORE.: NEVER TRUE

## 2022-04-20 SDOH — ECONOMIC STABILITY: FOOD INSECURITY: WITHIN THE PAST 12 MONTHS, THE FOOD YOU BOUGHT JUST DIDN'T LAST AND YOU DIDN'T HAVE MONEY TO GET MORE.: NEVER TRUE

## 2022-04-20 ASSESSMENT — PATIENT HEALTH QUESTIONNAIRE - PHQ9
SUM OF ALL RESPONSES TO PHQ QUESTIONS 1-9: 0
1. LITTLE INTEREST OR PLEASURE IN DOING THINGS: 0
2. FEELING DOWN, DEPRESSED OR HOPELESS: 0
SUM OF ALL RESPONSES TO PHQ9 QUESTIONS 1 & 2: 0
SUM OF ALL RESPONSES TO PHQ QUESTIONS 1-9: 0

## 2022-04-20 ASSESSMENT — SOCIAL DETERMINANTS OF HEALTH (SDOH): HOW HARD IS IT FOR YOU TO PAY FOR THE VERY BASICS LIKE FOOD, HOUSING, MEDICAL CARE, AND HEATING?: NOT HARD AT ALL

## 2022-04-20 ASSESSMENT — LIFESTYLE VARIABLES: HOW OFTEN DO YOU HAVE A DRINK CONTAINING ALCOHOL: NEVER

## 2022-04-20 NOTE — PROGRESS NOTES
Medicare Annual Wellness Visit    Tori Ortiz is here for Medicare AWV    Assessment & Plan   Initial Medicare annual wellness visit      Recommendations for Preventive Services Due: see orders and patient instructions/AVS.  Recommended screening schedule for the next 5-10 years is provided to the patient in written form: see Patient Instructions/AVS.     No follow-ups on file. Subjective       Patient's complete Health Risk Assessment and screening values have been reviewed and are found in Flowsheets. The following problems were reviewed today and where indicated follow up appointments were made and/or referrals ordered. Positive Risk Factor Screenings with Interventions:    Fall Risk:  Do you feel unsteady or are you worried about falling? : (!) yes (states she worries depending on where she is)  2 or more falls in past year?: no  Fall with injury in past year?: no              Patient states she will worry about falling depending on where she is walking.     General Health and ACP:  General  In general, how would you say your health is?: Good  In the past 7 days, have you experienced any of the following: New or Increased Pain, New or Increased Fatigue, Loneliness, Social Isolation, Stress or Anger?: No  Do you get the social and emotional support that you need?: Yes  Do you have a Living Will?: Yes    Advance Directives     Power of  Living Will ACP-Advance Directive ACP-Power of     Not on File Not on File Not on File Not on File      General Health Risk Interventions:  · No Living Will: ACP documents already completed- patient asked to provide copy to the office     Hearing/Vision:  Do you or your family notice any trouble with your hearing that hasn't been managed with hearing aids?: (!) Yes  Do you have difficulty driving, watching TV, or doing any of your daily activities because of your eyesight?: No  Have you had an eye exam within the past year?: (!) No  No exam data present    Hearing/Vision Interventions:  · Hearing concerns:  patient declines any further evaluation/treatment for hearing issues  · Vision concerns:  patient encouraged to make appointment with his/her eye specialist            Objective      Patient-Reported Vitals  No data recorded     Unable to obtain 3 vital signs due to patient not having equipment to take blood pressure/temperature. Allergies   Allergen Reactions    Dye [Iodides] Hives     Prior to Visit Medications    Medication Sig Taking? Authorizing Provider   Multiple Vitamins-Minerals (THERAPEUTIC MULTIVITAMIN-MINERALS) tablet Take 1 tablet by mouth daily Yes Historical Provider, MD   ELDERBERRY PO Take by mouth Yes Historical Provider, MD   ZINC CITRATE PO Take by mouth Yes Historical Provider, MD   vitamin C (ASCORBIC ACID) 500 MG tablet Take 500 mg by mouth daily Yes Historical Provider, MD   Caltrate 600+D Plus Minerals (CALTRATE) 600-800 MG-UNIT TABS tablet Take 1 tablet by mouth 2 times daily Yes Polina Sen MD   metroNIDAZOLE (METROGEL) 1 % gel Apply topically 2 times daily Yes Polina Sen MD       CareTeam (Including outside providers/suppliers regularly involved in providing care):   Patient Care Team:  Polina Sen MD as PCP - General (Family Medicine)  Polina Sen MD as PCP - [facility] Empaneled Provider  Referring Not In System (Inactive)    Reviewed and updated this visit:  Tobacco  Allergies  Meds  Med Hx  Surg Hx  Soc Hx  Fam Hx           Tanana Landy, was evaluated through a synchronous (real-time) audio-video encounter. The patient (or guardian if applicable) is aware that this is a billable service, which includes applicable co-pays. This Virtual Visit was conducted with patient's (and/or legal guardian's) consent.  The visit was conducted pursuant to the emergency declaration under the [facility], [address] waiver authority and the [employer] Supplemental Appropriations Act. Patient identification was verified, and a caregiver was present when appropriate. The patient was located at home in a state where the provider was licensed to provide care. Nancy Mann LPN, [birth year]/41[birth year], performed the documented evaluation under the direct supervision of the attending physician. Piotr Boyd, was evaluated through a synchronous (real-time) audio encounter. The patient (or guardian if applicable) is aware that this is a billable service, which includes applicable co-pays. This Virtual Visit was conducted with patient's (and/or legal guardian's) consent. The visit was conducted pursuant to the emergency declaration under the [facility] [address] authority and the [employer] General Act. Patient identification was verified, and a caregiver was present when appropriate. The patient was located at home in a state where the provider was licensed to provide care. Total time spent for this encounter: Not billed by time    --Ruddy Torrez LPN on 5/06/2046 at 0:20 AM    An electronic signature was used to authenticate this note.

## 2022-04-20 NOTE — PATIENT INSTRUCTIONS
Personalized Preventive Plan for RACTIV Cormier - 4/20/2022  Medicare offers a range of preventive health benefits. Some of the tests and screenings are paid in full while other may be subject to a deductible, co-insurance, and/or copay. Some of these benefits include a comprehensive review of your medical history including lifestyle, illnesses that may run in your family, and various assessments and screenings as appropriate. After reviewing your medical record and screening and assessments performed today your provider may have ordered immunizations, labs, imaging, and/or referrals for you. A list of these orders (if applicable) as well as your Preventive Care list are included within your After Visit Summary for your review. Other Preventive Recommendations:    · A preventive eye exam performed by an eye specialist is recommended every 1-2 years to screen for glaucoma; cataracts, macular degeneration, and other eye disorders. · A preventive dental visit is recommended every 6 months. · Try to get at least 150 minutes of exercise per week or 10,000 steps per day on a pedometer . · Order or download the FREE \"Exercise & Physical Activity: Your Everyday Guide\" from [employer]. Call 5-272.590.1209 or search [employer] online. · You need 1761-4358 mg of calcium and 5260-8888 IU of vitamin D per day. It is possible to meet your calcium requirement with diet alone, but a vitamin D supplement is usually necessary to meet this goal.  · When exposed to the sun, use a sunscreen that protects against both UVA and UVB radiation with an SPF of 30 or greater. Reapply every 2 to 3 hours or after sweating, drying off with a towel, or swimming. · Always wear a seat belt when traveling in a car. Always wear a helmet when riding a bicycle or motorcycle.

## 2022-07-11 ENCOUNTER — TELEMEDICINE (OUTPATIENT)
Dept: FAMILY MEDICINE CLINIC | Age: 67
End: 2022-07-11
Payer: MEDICARE

## 2022-07-11 DIAGNOSIS — L71.9 ROSACEA: ICD-10-CM

## 2022-07-11 DIAGNOSIS — U07.1 COVID-19 VIRUS INFECTION: Primary | ICD-10-CM

## 2022-07-11 PROCEDURE — 1090F PRES/ABSN URINE INCON ASSESS: CPT | Performed by: FAMILY MEDICINE

## 2022-07-11 PROCEDURE — 1123F ACP DISCUSS/DSCN MKR DOCD: CPT | Performed by: FAMILY MEDICINE

## 2022-07-11 PROCEDURE — G8427 DOCREV CUR MEDS BY ELIG CLIN: HCPCS | Performed by: FAMILY MEDICINE

## 2022-07-11 PROCEDURE — G8399 PT W/DXA RESULTS DOCUMENT: HCPCS | Performed by: FAMILY MEDICINE

## 2022-07-11 PROCEDURE — 99213 OFFICE O/P EST LOW 20 MIN: CPT | Performed by: FAMILY MEDICINE

## 2022-07-11 PROCEDURE — 3017F COLORECTAL CA SCREEN DOC REV: CPT | Performed by: FAMILY MEDICINE

## 2022-07-11 RX ORDER — METRONIDAZOLE 10 MG/G
GEL TOPICAL 2 TIMES DAILY
Qty: 15 G | Refills: 2 | Status: SHIPPED | OUTPATIENT
Start: 2022-07-11

## 2022-07-11 NOTE — PATIENT INSTRUCTIONS
BRING YOUR MEDICATION BOTTLES TO ALL APPOINTMENTS    Check MY CHART for test results. If you have forgotten your password, call [phone]. The diagnoses and medications listed in this after visit summary may not be up to date. Check MY CHART in 28-48 hours forcorrections. Late cancellation policy: So that we can better accommodate people who are sick, please give our office 24 hour notice for an appointment cancellation. Missed appointments: Your care is very important to us. It is important that you keep your scheduled appointments. Multiple missed appointments will lead to a dismissal from the office. Patients arriving late will be worked into the schedule as time permits, with patients arriving on time taken as scheduled. Late arriving patients are more than welcome to wait or reschedule their appointments. Please allow 5-7 business days for paperwork to be processed.

## 2022-07-11 NOTE — PROGRESS NOTES
EXAMINED]  Vital Signs: (As obtained by patient/caregiver or practitioner observation)    Blood pressure-  Heart rate-    Respiratory rate-    Temperature-  Pulse oximetry-     Constitutional: [x] Appears well-developed and well-nourished [x] No apparent distress      [] Abnormal-   Mental status  [x] Alert and awake  [x] Oriented to person/place/time [x]Able to follow commands      Eyes:  EOM    [x]  Normal  [] Abnormal-  Sclera  []  Normal  [] Abnormal -         Discharge []  None visible  [] Abnormal -    HENT:   [x] Normocephalic, atraumatic. [] Abnormal   [] Mouth/Throat: Mucous membranes are moist.     External Ears [] Normal  [] Abnormal-     Neck: [x] No visualized mass     Pulmonary/Chest: [x] Respiratory effort normal.  [x] No visualized signs of difficulty breathing or respiratory distress        [] Abnormal-      Musculoskeletal:   [] Normal gait with no signs of ataxia         [] Normal range of motion of neck        [] Abnormal-       Neurological:        [x] No Facial Asymmetry (Cranial nerve 7 motor function) (limited exam to video visit)          [] No gaze palsy        [] Abnormal-         Skin:        [] No significant exanthematous lesions or discoloration noted on facial skin         [x] Abnormal- appears to have rosacea on nose and chin           Psychiatric:       [x] Normal Affect [x] No Hallucinations        [] Abnormal-     Other pertinent observable physical exam findings-      Diagnosis Orders   1. COVID-19 virus infection  nirmatrelvir/ritonavir (PAXLOVID) 20 x 150 MG & 10 x 100MG   2. Rosacea  metroNIDAZOLE (METROGEL) 1 % gel       Return if symptoms worsen or fail to improve. Malachi Mtoa, was evaluated through a synchronous (real-time) audio-video encounter. The patient (or guardian if applicable) is aware that this is a billable service, which includes applicable co-pays. This Virtual Visit was conducted with patient's (and/or legal guardian's) consent.  The visit was conducted pursuant to the emergency declaration under the [facility], [facility] authority and the [employer] General Act. Patient identification was verified, and a caregiver was present when appropriate. The patient was located at Home: [address] [place] [address]. Provider was located at Home ([place] [address]): [place]. Total time spent on this encounter: Not billed by time    --Zuleyma Williamson MD on 7/11/2022 at 9:01 AM    An electronic signature was used to authenticate this note.

## 2022-11-02 ENCOUNTER — COMMUNITY OUTREACH (OUTPATIENT)
Dept: FAMILY MEDICINE CLINIC | Age: 67
End: 2022-11-02

## 2023-03-18 SDOH — ECONOMIC STABILITY: TRANSPORTATION INSECURITY
IN THE PAST 12 MONTHS, HAS LACK OF TRANSPORTATION KEPT YOU FROM MEETINGS, WORK, OR FROM GETTING THINGS NEEDED FOR DAILY LIVING?: NO

## 2023-03-18 SDOH — ECONOMIC STABILITY: FOOD INSECURITY: WITHIN THE PAST 12 MONTHS, YOU WORRIED THAT YOUR FOOD WOULD RUN OUT BEFORE YOU GOT MONEY TO BUY MORE.: NEVER TRUE

## 2023-03-18 SDOH — ECONOMIC STABILITY: FOOD INSECURITY: WITHIN THE PAST 12 MONTHS, THE FOOD YOU BOUGHT JUST DIDN'T LAST AND YOU DIDN'T HAVE MONEY TO GET MORE.: NEVER TRUE

## 2023-03-18 SDOH — ECONOMIC STABILITY: INCOME INSECURITY: HOW HARD IS IT FOR YOU TO PAY FOR THE VERY BASICS LIKE FOOD, HOUSING, MEDICAL CARE, AND HEATING?: NOT HARD AT ALL

## 2023-03-18 SDOH — ECONOMIC STABILITY: HOUSING INSECURITY
IN THE LAST 12 MONTHS, WAS THERE A TIME WHEN YOU DID NOT HAVE A STEADY PLACE TO SLEEP OR SLEPT IN A SHELTER (INCLUDING NOW)?: NO

## 2023-03-21 ENCOUNTER — OFFICE VISIT (OUTPATIENT)
Dept: FAMILY MEDICINE CLINIC | Age: 68
End: 2023-03-21
Payer: MEDICARE

## 2023-03-21 VITALS
OXYGEN SATURATION: 99 % | WEIGHT: 178 LBS | HEIGHT: 68 IN | BODY MASS INDEX: 26.98 KG/M2 | HEART RATE: 88 BPM | SYSTOLIC BLOOD PRESSURE: 102 MMHG | DIASTOLIC BLOOD PRESSURE: 70 MMHG

## 2023-03-21 DIAGNOSIS — M21.611 BUNION, RIGHT FOOT: ICD-10-CM

## 2023-03-21 DIAGNOSIS — Z13.220 SCREENING CHOLESTEROL LEVEL: ICD-10-CM

## 2023-03-21 DIAGNOSIS — Z12.31 ENCOUNTER FOR SCREENING MAMMOGRAM FOR BREAST CANCER: ICD-10-CM

## 2023-03-21 DIAGNOSIS — R73.03 PREDIABETES: Primary | ICD-10-CM

## 2023-03-21 DIAGNOSIS — Z12.11 SCREEN FOR COLON CANCER: ICD-10-CM

## 2023-03-21 DIAGNOSIS — M25.561 ACUTE PAIN OF RIGHT KNEE: ICD-10-CM

## 2023-03-21 DIAGNOSIS — L84 CALLUS OF FOOT: ICD-10-CM

## 2023-03-21 LAB
CHOLEST SERPL-MCNC: 218 MG/DL (ref 0–199)
HDLC SERPL-MCNC: 68 MG/DL (ref 40–60)
LDLC SERPL CALC-MCNC: 131 MG/DL
TRIGL SERPL-MCNC: 94 MG/DL (ref 0–150)
VLDLC SERPL CALC-MCNC: 19 MG/DL

## 2023-03-21 PROCEDURE — 3017F COLORECTAL CA SCREEN DOC REV: CPT | Performed by: FAMILY MEDICINE

## 2023-03-21 PROCEDURE — G8417 CALC BMI ABV UP PARAM F/U: HCPCS | Performed by: FAMILY MEDICINE

## 2023-03-21 PROCEDURE — G8484 FLU IMMUNIZE NO ADMIN: HCPCS | Performed by: FAMILY MEDICINE

## 2023-03-21 PROCEDURE — 1090F PRES/ABSN URINE INCON ASSESS: CPT | Performed by: FAMILY MEDICINE

## 2023-03-21 PROCEDURE — 1036F TOBACCO NON-USER: CPT | Performed by: FAMILY MEDICINE

## 2023-03-21 PROCEDURE — 1123F ACP DISCUSS/DSCN MKR DOCD: CPT | Performed by: FAMILY MEDICINE

## 2023-03-21 PROCEDURE — G8399 PT W/DXA RESULTS DOCUMENT: HCPCS | Performed by: FAMILY MEDICINE

## 2023-03-21 PROCEDURE — 99214 OFFICE O/P EST MOD 30 MIN: CPT | Performed by: FAMILY MEDICINE

## 2023-03-21 PROCEDURE — G8427 DOCREV CUR MEDS BY ELIG CLIN: HCPCS | Performed by: FAMILY MEDICINE

## 2023-03-21 PROCEDURE — 36415 COLL VENOUS BLD VENIPUNCTURE: CPT | Performed by: FAMILY MEDICINE

## 2023-03-21 ASSESSMENT — PATIENT HEALTH QUESTIONNAIRE - PHQ9
SUM OF ALL RESPONSES TO PHQ QUESTIONS 1-9: 0
SUM OF ALL RESPONSES TO PHQ9 QUESTIONS 1 & 2: 0
SUM OF ALL RESPONSES TO PHQ QUESTIONS 1-9: 0
SUM OF ALL RESPONSES TO PHQ QUESTIONS 1-9: 0
2. FEELING DOWN, DEPRESSED OR HOPELESS: 0
1. LITTLE INTEREST OR PLEASURE IN DOING THINGS: 0
SUM OF ALL RESPONSES TO PHQ QUESTIONS 1-9: 0

## 2023-03-21 NOTE — PROGRESS NOTES
years      Sex: Female      Is Non- : No      Diabetic: No      Tobacco smoker: No      Systolic Blood Pressure: 267 mmHg      Is BP treated: No      HDL Cholesterol: 71 mg/dL      Total Cholesterol: 204 mg/dL  Lab Review   No visits with results within 2 Month(s) from this visit. Latest known visit with results is:   Office Visit on 02/28/2022   Component Date Value    Cholesterol, Total 02/28/2022 204 (A)     Triglycerides 02/28/2022 74     HDL 02/28/2022 71 (A)     LDL Calculated 02/28/2022 118 (A)     VLDL Cholesterol Calcula* 02/28/2022 15     Hemoglobin A1C 02/28/2022 5.7            Assessment:       Diagnosis Orders   1. Prediabetes  Hemoglobin A1C      2. [facility] colon cancer  [facility]      3. Bunion, right foot  Amb External Referral To Podiatry      4. Callus of foot  Amb External Referral To Podiatry      5. Encounter for screening mammogram for breast cancer  HEAVENLY DIGITAL SCREEN W CAD BILATERAL PER PROTOCOL      6. Screening cholesterol level  Lipid Panel      7. Acute pain of right knee                Plan:      I am not sure what her knee pain etiology is. We cannot redemonstrate it this morning. Advised her on quadricep stretches. Do this 30 seconds twice a day. She can also do the exercise where she sits against the wall as if she is in a chair. And hold that for 60 seconds. Demonstrated exercises where she has a stretch band around her lower leg. And walks across the room right to left until her hips are fatigued. We will refer to podiatry    Can go relations on further weight loss since last time. Return in about 51 weeks (around 3/12/2024) for IGT, Hyperlipid.

## 2023-03-22 LAB
EST. AVERAGE GLUCOSE BLD GHB EST-MCNC: 122.6 MG/DL
HBA1C MFR BLD: 5.9 %

## 2023-03-27 ENCOUNTER — TELEPHONE (OUTPATIENT)
Dept: GASTROENTEROLOGY | Age: 68
End: 2023-03-27

## 2023-03-27 RX ORDER — POLYETHYLENE GLYCOL 3350, SODIUM SULFATE ANHYDROUS, SODIUM BICARBONATE, SODIUM CHLORIDE, POTASSIUM CHLORIDE 236; 22.74; 6.74; 5.86; 2.97 G/4L; G/4L; G/4L; G/4L; G/4L
4 POWDER, FOR SOLUTION ORAL ONCE
Qty: 4000 ML | Refills: 0 | Status: SHIPPED | OUTPATIENT
Start: 2023-03-27 | End: 2023-03-27

## 2023-04-26 ENCOUNTER — TELEMEDICINE (OUTPATIENT)
Dept: FAMILY MEDICINE CLINIC | Age: 68
End: 2023-04-26
Payer: MEDICARE

## 2023-04-26 ENCOUNTER — TELEPHONE (OUTPATIENT)
Dept: FAMILY MEDICINE CLINIC | Age: 68
End: 2023-04-26

## 2023-04-26 DIAGNOSIS — Z00.00 MEDICARE ANNUAL WELLNESS VISIT, SUBSEQUENT: Primary | ICD-10-CM

## 2023-04-26 PROCEDURE — G0439 PPPS, SUBSEQ VISIT: HCPCS | Performed by: FAMILY MEDICINE

## 2023-04-26 PROCEDURE — 1123F ACP DISCUSS/DSCN MKR DOCD: CPT | Performed by: FAMILY MEDICINE

## 2023-04-26 PROCEDURE — 3017F COLORECTAL CA SCREEN DOC REV: CPT | Performed by: FAMILY MEDICINE

## 2023-04-26 ASSESSMENT — LIFESTYLE VARIABLES
HOW OFTEN DO YOU HAVE A DRINK CONTAINING ALCOHOL: NEVER
HOW MANY STANDARD DRINKS CONTAINING ALCOHOL DO YOU HAVE ON A TYPICAL DAY: PATIENT DOES NOT DRINK

## 2023-04-26 ASSESSMENT — PATIENT HEALTH QUESTIONNAIRE - PHQ9
SUM OF ALL RESPONSES TO PHQ QUESTIONS 1-9: 0
SUM OF ALL RESPONSES TO PHQ QUESTIONS 1-9: 0
SUM OF ALL RESPONSES TO PHQ9 QUESTIONS 1 & 2: 0
2. FEELING DOWN, DEPRESSED OR HOPELESS: 0
SUM OF ALL RESPONSES TO PHQ QUESTIONS 1-9: 0
1. LITTLE INTEREST OR PLEASURE IN DOING THINGS: 0
SUM OF ALL RESPONSES TO PHQ QUESTIONS 1-9: 0

## 2023-04-26 NOTE — PROGRESS NOTES
This encounter was performed under my, Nanda Brar, direct supervision, 4/26/2023.
Lon Amaya LPN, [birth year], performed the documented evaluation under the direct supervision of the attending physician. Nahomy Kaitlin, was evaluated through a synchronous (real-time) audio encounter. The patient (or guardian if applicable) is aware that this is a billable service, which includes applicable co-pays. This Virtual Visit was conducted with patient's (and/or legal guardian's) consent. The visit was conducted pursuant to the emergency declaration under the [address] [place] [address], [address] [place] [address] authority and the [email or web address] General Act. Patient identification was verified, and a caregiver was present when appropriate. The patient was located at Home: [address]  Provider was located at [facility] (Appt Dept): [address]         Total time spent for this encounter: Not billed by time    --Pili Carlos LPN on 2/07/0156 at 0:58 AM    An electronic signature was used to authenticate this note.       Pili Carlos LPN

## 2023-04-26 NOTE — PATIENT INSTRUCTIONS
Personalized Preventive Plan for Jv Ao - 4/26/2023  Medicare offers a range of preventive health benefits. Some of the tests and screenings are paid in full while other may be subject to a deductible, co-insurance, and/or copay. Some of these benefits include a comprehensive review of your medical history including lifestyle, illnesses that may run in your family, and various assessments and screenings as appropriate. After reviewing your medical record and screening and assessments performed today your provider may have ordered immunizations, labs, imaging, and/or referrals for you. A list of these orders (if applicable) as well as your Preventive Care list are included within your After Visit Summary for your review. Other Preventive Recommendations:    A preventive eye exam performed by an eye specialist is recommended every 1-2 years to screen for glaucoma; cataracts, macular degeneration, and other eye disorders. A preventive dental visit is recommended every 6 months. Try to get at least 150 minutes of exercise per week or 10,000 steps per day on a pedometer . Order or download the FREE \"Exercise & Physical Activity: Your Everyday Guide\" from [employer]. Call 8-393.500.8093 or search [employer] online. You need 7843-1789 mg of calcium and 7785-0814 IU of vitamin D per day. It is possible to meet your calcium requirement with diet alone, but a vitamin D supplement is usually necessary to meet this goal.  When exposed to the sun, use a sunscreen that protects against both UVA and UVB radiation with an SPF of 30 or greater. Reapply every 2 to 3 hours or after sweating, drying off with a towel, or swimming. Always wear a seat belt when traveling in a car. Always wear a helmet when riding a bicycle or motorcycle.

## 2023-05-11 ENCOUNTER — ANESTHESIA EVENT (OUTPATIENT)
Dept: OPERATING ROOM | Age: 68
End: 2023-05-11
Payer: MEDICARE

## 2023-05-11 RX ORDER — ACETAMINOPHEN 325 MG/1
650 TABLET ORAL EVERY 6 HOURS PRN
COMMUNITY

## 2023-05-11 RX ORDER — IBUPROFEN 200 MG
200 TABLET ORAL EVERY 6 HOURS PRN
COMMUNITY

## 2023-05-15 ENCOUNTER — HOSPITAL ENCOUNTER (OUTPATIENT)
Age: 68
Setting detail: OUTPATIENT SURGERY
Discharge: HOME | End: 2023-05-15
Attending: INTERNAL MEDICINE | Admitting: INTERNAL MEDICINE
Payer: MEDICARE

## 2023-05-15 ENCOUNTER — ANESTHESIA (OUTPATIENT)
Dept: OPERATING ROOM | Age: 68
End: 2023-05-15
Payer: MEDICARE

## 2023-05-15 VITALS
TEMPERATURE: 97.4 F | BODY MASS INDEX: 27.28 KG/M2 | WEIGHT: 180 LBS | RESPIRATION RATE: 16 BRPM | HEIGHT: 68 IN | SYSTOLIC BLOOD PRESSURE: 116 MMHG | OXYGEN SATURATION: 100 % | DIASTOLIC BLOOD PRESSURE: 92 MMHG | HEART RATE: 57 BPM

## 2023-05-15 PROBLEM — Z12.11 COLON CANCER SCREENING: Status: ACTIVE | Noted: 2023-05-15

## 2023-05-15 PROCEDURE — 2709999900 HC NON-CHARGEABLE SUPPLY: Performed by: INTERNAL MEDICINE

## 2023-05-15 PROCEDURE — 3700000001 HC ADD 15 MINUTES (ANESTHESIA): Performed by: INTERNAL MEDICINE

## 2023-05-15 PROCEDURE — 6360000002 HC RX W HCPCS

## 2023-05-15 PROCEDURE — 7100000011 HC PHASE II RECOVERY - ADDTL 15 MIN: Performed by: INTERNAL MEDICINE

## 2023-05-15 PROCEDURE — 3700000000 HC ANESTHESIA ATTENDED CARE: Performed by: INTERNAL MEDICINE

## 2023-05-15 PROCEDURE — 2580000003 HC RX 258: Performed by: ANESTHESIOLOGY

## 2023-05-15 PROCEDURE — 3609027000 HC COLONOSCOPY: Performed by: INTERNAL MEDICINE

## 2023-05-15 PROCEDURE — 7100000010 HC PHASE II RECOVERY - FIRST 15 MIN: Performed by: INTERNAL MEDICINE

## 2023-05-15 RX ORDER — SODIUM CHLORIDE, SODIUM LACTATE, POTASSIUM CHLORIDE, CALCIUM CHLORIDE 600; 310; 30; 20 MG/100ML; MG/100ML; MG/100ML; MG/100ML
INJECTION, SOLUTION INTRAVENOUS CONTINUOUS
Status: DISCONTINUED | OUTPATIENT
Start: 2023-05-15 | End: 2023-05-15 | Stop reason: HOSPADM

## 2023-05-15 RX ORDER — PROPOFOL 10 MG/ML
INJECTION, EMULSION INTRAVENOUS PRN
Status: DISCONTINUED | OUTPATIENT
Start: 2023-05-15 | End: 2023-05-15 | Stop reason: SDUPTHER

## 2023-05-15 RX ORDER — LIDOCAINE HYDROCHLORIDE 20 MG/ML
INJECTION, SOLUTION INTRAVENOUS PRN
Status: DISCONTINUED | OUTPATIENT
Start: 2023-05-15 | End: 2023-05-15 | Stop reason: SDUPTHER

## 2023-05-15 RX ADMIN — PROPOFOL 230 MG: 10 INJECTION, EMULSION INTRAVENOUS at 09:56

## 2023-05-15 RX ADMIN — LIDOCAINE HYDROCHLORIDE 100 MG: 20 INJECTION, SOLUTION INTRAVENOUS at 09:56

## 2023-05-15 RX ADMIN — SODIUM CHLORIDE, POTASSIUM CHLORIDE, SODIUM LACTATE AND CALCIUM CHLORIDE: 600; 310; 30; 20 INJECTION, SOLUTION INTRAVENOUS at 08:31

## 2023-05-15 ASSESSMENT — PAIN SCALES - GENERAL
PAINLEVEL_OUTOF10: 2
PAINLEVEL_OUTOF10: 0
PAINLEVEL_OUTOF10: 1

## 2023-05-15 ASSESSMENT — PAIN DESCRIPTION - FREQUENCY: FREQUENCY: CONTINUOUS

## 2023-05-15 ASSESSMENT — PAIN DESCRIPTION - LOCATION
LOCATION: ABDOMEN
LOCATION: FOOT;KNEE;LEG

## 2023-05-15 ASSESSMENT — PAIN DESCRIPTION - ORIENTATION: ORIENTATION: RIGHT

## 2023-05-15 ASSESSMENT — PAIN DESCRIPTION - DESCRIPTORS
DESCRIPTORS: ACHING;CRAMPING
DESCRIPTORS: CRAMPING

## 2023-05-15 NOTE — ANESTHESIA POSTPROCEDURE EVALUATION
Department of Anesthesiology  Postprocedure Note    Patient: Jessica Orozco  MRN: [id number]  YOB: 1955  Date of evaluation: 5/15/2023      Procedure Summary     Date: 05/15/23 Room / Location: [facility] 12 04 / [facility]    Anesthesia Start: 1134 Anesthesia Stop: 2051    Procedure: COLORECTAL CANCER SCREENING, NOT HIGH RISK Diagnosis:       Colon cancer screening      (Colon cancer screening [Z12.11])    Surgeons: Samuel Lowe MD Responsible Provider: MARCIE Dowling CRNA    Anesthesia Type: MAC ASA Status: 2          Anesthesia Type: No value filed.     Deonna Phase I: Deonna Score: 10    Deonna Phase II:        Anesthesia Post Evaluation    Patient location during evaluation: bedside  Patient participation: complete - patient participated  Level of consciousness: awake  Pain score: 0  Airway patency: patent  Nausea & Vomiting: no nausea and no vomiting  Complications: no  Cardiovascular status: blood pressure returned to baseline and hemodynamically stable  Respiratory status: acceptable and room air  Hydration status: euvolemic

## 2023-05-15 NOTE — PROGRESS NOTES
1025 Pt received from [facility] and report received from [facility] [place]. Pt abdomen soft and non tender.  at beside. Call light in reach. 1035 Pt up to restroom. Pt tolerated well. Pt bed to room and sitting on side of bed. Pt denies c/o. Pt ready to get dressed to go home.  at beside. Call light in reach. 1049 Discharge instructions given to pt and . Both verbalized understanding of instructions. Pt denies c/o. 8632  to get car. Call light in pt reach. 1 Pt discharged to home to husbands awaiting vehicle to drive pt home. Pt denies c/o.

## 2023-05-15 NOTE — ANESTHESIA PRE PROCEDURE
Department of Anesthesiology  Preprocedure Note       Name:  Erick Mooney   Age:  79 y.o.  :  1955                                          MRN:  [id number]         Date:  5/15/2023      Surgeon: Leopoldo Phan):  Joseph Orlando MD    Procedure: Procedure(s):  COLORECTAL CANCER SCREENING, NOT HIGH RISK    Medications prior to admission:   Prior to Admission medications    Medication Sig Start Date End Date Taking? Authorizing Provider   acetaminophen (TYLENOL) 325 MG tablet Take 2 tablets by mouth every 6 hours as needed for Pain   Yes Historical Provider, MD   ibuprofen (ADVIL;MOTRIN) 200 MG tablet Take 1 tablet by mouth every 6 hours as needed for Pain   Yes Historical Provider, MD   Multiple Vitamins-Minerals (THERAPEUTIC MULTIVITAMIN-MINERALS) tablet Take 1 tablet by mouth daily    Historical Provider, MD   ELDERBERRY PO Take by mouth    Historical Provider, MD   Caltrate 600+D Plus Minerals (CALTRATE) 600-800 MG-UNIT TABS tablet Take 1 tablet by mouth 2 times daily  Patient not taking: Reported on 2023   Kaushal Palma MD       Current medications:    Current Facility-Administered Medications   Medication Dose Route Frequency Provider Last Rate Last Admin    lactated ringers IV soln infusion   IntraVENous Continuous Thelma Rowell MD 50 mL/hr at 05/15/23 0831 New Bag at 05/15/23 0831       Allergies: Allergies   Allergen Reactions    Dye [Iodides] Hives       Problem List:    Patient Active Problem List   Diagnosis Code    Obesity (BMI 30.0-34. 9) E66.9    Family history of ovarian cancer Z80.41    Rosacea L71.9    Prediabetes R73.03    Osteopenia of left hip M85.852       Past Medical History:        Diagnosis Date    Parotid nodule     right side    Seasonal allergies        Past Surgical History:        Procedure Laterality Date    APPENDECTOMY      PAROTIDECTOMY      partial    TONSILLECTOMY         Social History:    Social History     Tobacco Use    Smoking status:

## 2023-05-15 NOTE — H&P
hip replacement    Other Brother         bilateral knee replacements    Ovarian Cancer Maternal Aunt        REVIEW OF SYSTEMS:    Negative except for HPI        PHYSICAL EXAM:      Vitals:    /79   Pulse 58   Temp 97.1 °F (36.2 °C) (Temporal)   Resp 16   Ht 5' 7.5\" (1.715 m)   Wt 180 lb (81.6 kg)   LMP  (LMP Unknown)   SpO2 100%   BMI 27.78 kg/m²     General Appearance:    Alert, cooperative, no distress, appears stated age   HEENT:    NO anemia, No jaundice , no Cyanosis, Supple neck   Neck:   Supple, symmetrical, trachea midline, no adenopathy;     thyroid:    Lungs:     Clear to auscultation bilaterally, respirations unlabored   Chest Wall:    No tenderness or deformity    Heart:    Regular rate and rhythm, S1 and S2 normal, no murmur, rub   or gallop   Abdomen:     Soft, non-tender, bowel sounds active all four quadrants,     no masses, no organomegaly, no ascitis   Rectal:    Planned at time of C scope   Extremities:   Extremities normal, atraumatic, no cyanosis or edema   Pulses:   2+ and symmetric all extremities   Skin:   Skin color, texture, turgor normal, no rashes or lesions   Lymph nodes:   No abnormality   Neurologic:   No focal deficits, moving all four extremities      ASA Grade:  ASA 2 - Patient with mild systemic disease with no functional limitations  Air Way: As per Pre-procedure Anesthesia note. ASSESSMENT AND PLAN:    1. Patient is a 79 y.o. female here for colonoscopy with Anesthesia. 2.  Procedure options, risks and benefits reviewed with patient. Patient expresses understanding.     Chin Crowe MD  5/15/2023  9:08 AM

## 2023-05-15 NOTE — DISCHARGE INSTRUCTIONS
[facility]  [phone]    Do not drive, work around [address] [place], [place] [address] St or use equipment. Do not drink any alcoholic beverages. Do not smoke while alone. Avoid making important decisions. Plan to spend a quiet, relaxed evening @ home. Resume normal activities as you begin to feel better. Eat lightly for your first meal, then gradually increase your diet to what is normal for you. In case of nausea, avoid food and drink only clear liquids. Resume food as nausea ceases. Notify your surgeon if you experience fever, chills, large amount of bleeding, difficulty breathing, persistent nausea and vomiting or any other disturbing problem. Call for a follow-up appointment with your surgeon. COLONOSCOPY    ________________________________    OFFICE AGURDN___[phone]_____________________    FOLLOW UP APPOINTMENT I AS NEEDED. Call office to reschedule Colonoscopy with extended prep. TEST ORDERED: NONE     What to expect at home: Your Recovery   Your doctor will tell you when you can eat and do your other usual activities Your doctor will talk to you about when you will need your next colonoscopy. Your doctor can help you decide how often you need to be checked. This will depend on the results of your test and your risk for colorectal cancer. After the test, you may be bloated or have gas pains. You may need to pass gas. If a biopsy was done or a polyp was removed, you may have streaks of blood in your stool (feces) for a few days. This care sheet gives you a general idea about how long it will take for you to recover. But each person recovers at a different pace. Follow the steps below to get better as quickly as possible. How can you care for yourself at home? Activity  Rest when you feel tired. Diet  Follow your doctor's directions for eating. Unless your doctor has told you not to, drink plenty of fluids.  This helps to replace the fluids that were lost during the

## 2023-06-14 PROBLEM — Z12.11 COLON CANCER SCREENING: Status: RESOLVED | Noted: 2023-05-15 | Resolved: 2023-06-14

## 2023-09-18 ENCOUNTER — OFFICE VISIT (OUTPATIENT)
Dept: FAMILY MEDICINE CLINIC | Age: 68
End: 2023-09-18
Payer: MEDICARE

## 2023-09-18 VITALS
DIASTOLIC BLOOD PRESSURE: 72 MMHG | WEIGHT: 187.8 LBS | HEART RATE: 65 BPM | TEMPERATURE: 97.8 F | OXYGEN SATURATION: 97 % | BODY MASS INDEX: 28.98 KG/M2 | SYSTOLIC BLOOD PRESSURE: 102 MMHG

## 2023-09-18 DIAGNOSIS — L25.5 CONTACT DERMATITIS DUE TO PLANT: Primary | ICD-10-CM

## 2023-09-18 PROCEDURE — 99213 OFFICE O/P EST LOW 20 MIN: CPT | Performed by: FAMILY MEDICINE

## 2023-09-18 PROCEDURE — 1123F ACP DISCUSS/DSCN MKR DOCD: CPT | Performed by: FAMILY MEDICINE

## 2023-09-18 PROCEDURE — 3017F COLORECTAL CA SCREEN DOC REV: CPT | Performed by: FAMILY MEDICINE

## 2023-09-18 PROCEDURE — 1090F PRES/ABSN URINE INCON ASSESS: CPT | Performed by: FAMILY MEDICINE

## 2023-09-18 PROCEDURE — G8417 CALC BMI ABV UP PARAM F/U: HCPCS | Performed by: FAMILY MEDICINE

## 2023-09-18 PROCEDURE — G8399 PT W/DXA RESULTS DOCUMENT: HCPCS | Performed by: FAMILY MEDICINE

## 2023-09-18 PROCEDURE — G8427 DOCREV CUR MEDS BY ELIG CLIN: HCPCS | Performed by: FAMILY MEDICINE

## 2023-09-18 PROCEDURE — 1036F TOBACCO NON-USER: CPT | Performed by: FAMILY MEDICINE

## 2023-09-18 RX ORDER — PREDNISONE 10 MG/1
TABLET ORAL
Qty: 35 TABLET | Refills: 0 | Status: SHIPPED | OUTPATIENT
Start: 2023-09-18 | End: 2023-10-02

## 2024-01-23 ENCOUNTER — HOSPITAL ENCOUNTER (OUTPATIENT)
Dept: MAMMOGRAPHY | Age: 69
Discharge: HOME | End: 2024-01-23
Payer: MEDICARE

## 2024-01-23 VITALS — BODY MASS INDEX: 29.35 KG/M2 | HEIGHT: 67 IN | WEIGHT: 187 LBS

## 2024-01-23 DIAGNOSIS — Z12.31 ENCOUNTER FOR SCREENING MAMMOGRAM FOR BREAST CANCER: ICD-10-CM

## 2024-01-23 PROCEDURE — 77063 BREAST TOMOSYNTHESIS BI: CPT

## 2024-05-01 ENCOUNTER — TELEPHONE (OUTPATIENT)
Dept: FAMILY MEDICINE CLINIC | Age: 69
End: 2024-05-01

## 2024-10-25 NOTE — PROGRESS NOTES
medical history on file. Past Surgical History:   Procedure Laterality Date    APPENDECTOMY      PAROTIDECTOMY      partial    TONSILLECTOMY       Family History   Problem Relation Age of Onset    Heart Failure Sister     High Cholesterol Sister     Ovarian Cancer Mother     Ovarian Cancer Maternal Aunt      Social History     Social History    Marital status:      Spouse name: N/A    Number of children: N/A    Years of education: N/A     Occupational History    Credit and housing counselor      Social History Main Topics    Smoking status: Never Smoker    Smokeless tobacco: Never Used    Alcohol use No    Drug use: Unknown    Sexual activity: Not on file     Other Topics Concern    Not on file     Social History Narrative    No narrative on file       Review of Systems:  A comprehensive review of systems was negative except for what was noted in the HPI. Physical Exam:   Vitals:    09/21/18 0934   BP: 120/70   Site: Right Upper Arm   Position: Sitting   Cuff Size: Large Adult   Pulse: (!) 49   Weight: 197 lb 12.8 oz (89.7 kg)   Height: 5' 7.5\" (1.715 m)     Body mass index is 30.52 kg/m². Constitutional: She is oriented to person, place, and time. She appears well-developed and well-nourished. No distress. HEENT:   Head: Normocephalic and atraumatic. Right Ear: right ear occluded by cerumen   Left Ear: Tympanic membrane, external ear and ear canal normal.   Nose: Nose normal.   Mouth/Throat: Oropharynx is clear and moist, and mucous membranes are normal.  There is no cervical adenopathy. Eyes: Conjunctivae and extraocular motions are normal. Pupils are equal, round, and reactive to light. Neck: Neck supple. No JVD present. Carotid bruit is not present. No mass and no thyromegaly present. Cardiovascular: Bradycardic rate, regular rhythm, normal heart sounds and intact distal pulses. Exam reveals no gallop and no friction rub. No murmur heard.   Pulmonary/Chest: Effort normal and breath sounds normal. No respiratory distress. She has no wheezes, rhonchi or rales. Abdominal: Soft, non-tender. Bowel sounds and aorta are normal. She exhibits no organomegaly, mass or bruit. Genitourinary: urethral meatus normal, normal appearing vulva with no masses, tenderness or lesions, Vagina:  atrophic mucosa, Cervix:  Friable cervix, Uterus:  normal single, nontender and Adnexa:  normal bimanual exam.  Breast exam:  breasts appear normal, no suspicious masses, no skin or nipple changes or axillary nodes. Musculoskeletal: Normal range of motion, no synovitis. She exhibits no edema. Neurological: She is alert and oriented to person, place, and time. She has normal reflexes. No cranial nerve deficit. Coordination normal.   Skin: Skin is warm and dry. There is no rash or erythema. No suspicious lesions noted. Psychiatric: She has a normal mood and affect. Her speech is normal and behavior is normal. Judgment, cognition and memory are normal.       The ASCVD Risk score (Yunier Simpson., et al., 2013) failed to calculate for the following reasons:    Cannot find a previous HDL lab    Cannot find a previous total cholesterol lab    EKG: sinus bradycardia. Lab Review:   No visits with results within 6 Month(s) from this visit. Latest known visit with results is:   No results found for any previous visit. Assessment/Plan:    Alma Goldberg was seen today for annual exam and flu vaccine. Diagnoses and all orders for this visit:    Annual physical exam  -     Lipid Panel  -     TSH without Reflex  -     Hepatitis C Antibody  -     HEMOGLOBIN A1C  -     PAP SMEAR  -     EKG 12 lead  -     Basic Metabolic Panel  -     CBC Auto Differential  -     Mammography screening bilateral; Future    Need for influenza vaccination  -     INFLUENZA, QUADV, 3 YRS AND OLDER, IM, PF, PREFILL SYR OR SDV, 0.5ML (FLUZONE QUADV, PF)    Obesity (BMI 30.0-34. 9)    Sinus bradycardia  -     EKG 12 lead    Family history of ovarian cancer    Rosacea  -     metroNIDAZOLE (METROGEL) 1 % gel; Apply topically 2 times daily        Goal of 5 vegetables and fruits per day or half the plate be vegetable and fruits  2-3 serving of dairy per day. Encourage aerobic exercise up to 150 minutes per week. Patient performs plenty of cardiac exercise so this could explain the bradycardia. However, will check TSH. Per USPTF guidelines, patient is  needing mammogram at this time    No skin lesions seen. Will get dermatology consult note    Patient is due for colon cancer screening. .  Is preferring to have stool testing done rather than get a colonoscopy. The patient does not have a family history of colon cancer and does not have bleeding when they have bowel movements. Therefore, the stool cards were given to patient. The utube video demonstrating how to use the cards was shown to patient in the room. The patient will be contacted in 2 weeks if the cards have not been returned. Detail Level: Detailed Depth Of Biopsy: dermis Was A Bandage Applied: Yes Size Of Lesion In Cm: 0 Biopsy Type: H and E Biopsy Method: double edge Personna blade Anesthesia Type: 1% lidocaine with epinephrine Anesthesia Volume In Cc: 0.5 Hemostasis: Ferric chloride Wound Care: Petrolatum Dressing: bandage Destruction After The Procedure: No Type Of Destruction Used: Curettage Curettage Text: The wound bed was treated with curettage after the biopsy was performed. Cryotherapy Text: The wound bed was treated with cryotherapy after the biopsy was performed. Electrodesiccation Text: The wound bed was treated with electrodesiccation after the biopsy was performed. Electrodesiccation And Curettage Text: The wound bed was treated with electrodesiccation and curettage after the biopsy was performed. Silver Nitrate Text: The wound bed was treated with silver nitrate after the biopsy was performed. Lab: [id number] Lab Facility: [id number] Consent: Written consent was obtained and risks were reviewed including but not limited to scarring, infection, bleeding, scabbing, incomplete removal, nerve damage and allergy to anesthesia. Post-Care Instructions: I reviewed with the patient in detail post-care instructions. Patient is to keep the biopsy site dry overnight, and then apply bacitracin twice daily until healed. Patient may apply hydrogen peroxide soaks to remove any crusting. Notification Instructions: Patient will be notified of biopsy results. However, patient instructed to call the office if not contacted within 2 weeks. Billing Type: Third-Party Bill Information: Selecting Yes will display possible errors in your note based on the variables you have selected. This validation is only offered as a suggestion for you. PLEASE NOTE THAT THE VALIDATION TEXT WILL BE REMOVED WHEN YOU FINALIZE YOUR NOTE. IF YOU WANT TO FAX A PRELIMINARY NOTE YOU WILL NEED TO TOGGLE THIS TO 'NO' IF YOU DO NOT WANT IT IN YOUR FAXED NOTE.

## 2025-04-26 ENCOUNTER — HOSPITAL ENCOUNTER (OUTPATIENT)
Dept: MAMMOGRAPHY | Age: 70
Discharge: HOME | End: 2025-04-26
Payer: MEDICARE

## 2025-04-26 VITALS — BODY MASS INDEX: 29.55 KG/M2 | WEIGHT: 195 LBS | HEIGHT: 68 IN

## 2025-04-26 DIAGNOSIS — Z12.31 SCREENING MAMMOGRAM, ENCOUNTER FOR: ICD-10-CM

## 2025-04-26 PROCEDURE — 77063 BREAST TOMOSYNTHESIS BI: CPT

## 2025-05-26 SDOH — HEALTH STABILITY: PHYSICAL HEALTH: ON AVERAGE, HOW MANY DAYS PER WEEK DO YOU ENGAGE IN MODERATE TO STRENUOUS EXERCISE (LIKE A BRISK WALK)?: 6 DAYS

## 2025-05-26 SDOH — HEALTH STABILITY: PHYSICAL HEALTH: ON AVERAGE, HOW MANY MINUTES DO YOU ENGAGE IN EXERCISE AT THIS LEVEL?: 50 MIN

## 2025-05-26 ASSESSMENT — LIFESTYLE VARIABLES
HOW OFTEN DO YOU HAVE A DRINK CONTAINING ALCOHOL: MONTHLY OR LESS
HOW OFTEN DO YOU HAVE A DRINK CONTAINING ALCOHOL: 2
HOW OFTEN DO YOU HAVE SIX OR MORE DRINKS ON ONE OCCASION: 1
HOW MANY STANDARD DRINKS CONTAINING ALCOHOL DO YOU HAVE ON A TYPICAL DAY: 1 OR 2
HOW MANY STANDARD DRINKS CONTAINING ALCOHOL DO YOU HAVE ON A TYPICAL DAY: 1

## 2025-05-26 ASSESSMENT — PATIENT HEALTH QUESTIONNAIRE - PHQ9
SUM OF ALL RESPONSES TO PHQ QUESTIONS 1-9: 0
SUM OF ALL RESPONSES TO PHQ QUESTIONS 1-9: 0
1. LITTLE INTEREST OR PLEASURE IN DOING THINGS: NOT AT ALL
SUM OF ALL RESPONSES TO PHQ QUESTIONS 1-9: 0
SUM OF ALL RESPONSES TO PHQ QUESTIONS 1-9: 0
2. FEELING DOWN, DEPRESSED OR HOPELESS: NOT AT ALL

## 2025-06-03 SDOH — ECONOMIC STABILITY: FOOD INSECURITY: WITHIN THE PAST 12 MONTHS, YOU WORRIED THAT YOUR FOOD WOULD RUN OUT BEFORE YOU GOT MONEY TO BUY MORE.: NEVER TRUE

## 2025-06-03 SDOH — ECONOMIC STABILITY: FOOD INSECURITY: WITHIN THE PAST 12 MONTHS, THE FOOD YOU BOUGHT JUST DIDN'T LAST AND YOU DIDN'T HAVE MONEY TO GET MORE.: NEVER TRUE

## 2025-06-03 SDOH — ECONOMIC STABILITY: INCOME INSECURITY: IN THE LAST 12 MONTHS, WAS THERE A TIME WHEN YOU WERE NOT ABLE TO PAY THE MORTGAGE OR RENT ON TIME?: NO

## 2025-06-03 SDOH — ECONOMIC STABILITY: TRANSPORTATION INSECURITY
IN THE PAST 12 MONTHS, HAS THE LACK OF TRANSPORTATION KEPT YOU FROM MEDICAL APPOINTMENTS OR FROM GETTING MEDICATIONS?: NO

## 2025-06-05 ENCOUNTER — OFFICE VISIT (OUTPATIENT)
Dept: FAMILY MEDICINE CLINIC | Age: 70
End: 2025-06-05
Payer: MEDICARE

## 2025-06-05 VITALS
DIASTOLIC BLOOD PRESSURE: 70 MMHG | OXYGEN SATURATION: 98 % | RESPIRATION RATE: 16 BRPM | BODY MASS INDEX: 31.61 KG/M2 | HEART RATE: 51 BPM | SYSTOLIC BLOOD PRESSURE: 110 MMHG | HEIGHT: 67 IN | WEIGHT: 201.4 LBS

## 2025-06-05 DIAGNOSIS — Z23 NEED FOR SHINGLES VACCINE: ICD-10-CM

## 2025-06-05 DIAGNOSIS — Z23 NEED FOR PNEUMOCOCCAL VACCINE: ICD-10-CM

## 2025-06-05 DIAGNOSIS — Z13.220 SCREENING CHOLESTEROL LEVEL: ICD-10-CM

## 2025-06-05 DIAGNOSIS — R73.03 PREDIABETES: ICD-10-CM

## 2025-06-05 DIAGNOSIS — Z00.00 MEDICARE ANNUAL WELLNESS VISIT, SUBSEQUENT: Primary | ICD-10-CM

## 2025-06-05 DIAGNOSIS — R63.5 WEIGHT GAIN: ICD-10-CM

## 2025-06-05 LAB
CHOLEST SERPL-MCNC: 221 MG/DL (ref 0–199)
EST. AVERAGE GLUCOSE BLD GHB EST-MCNC: 137 MG/DL
HBA1C MFR BLD: 6.4 %
HDLC SERPL-MCNC: 60 MG/DL (ref 40–60)
LDLC SERPL CALC-MCNC: 142 MG/DL
TRIGL SERPL-MCNC: 96 MG/DL (ref 0–150)
VLDLC SERPL CALC-MCNC: 19 MG/DL

## 2025-06-05 PROCEDURE — 36415 COLL VENOUS BLD VENIPUNCTURE: CPT | Performed by: FAMILY MEDICINE

## 2025-06-05 PROCEDURE — 90677 PCV20 VACCINE IM: CPT | Performed by: FAMILY MEDICINE

## 2025-06-05 PROCEDURE — 1159F MED LIST DOCD IN RCRD: CPT | Performed by: FAMILY MEDICINE

## 2025-06-05 PROCEDURE — G0009 ADMIN PNEUMOCOCCAL VACCINE: HCPCS | Performed by: FAMILY MEDICINE

## 2025-06-05 PROCEDURE — G0439 PPPS, SUBSEQ VISIT: HCPCS | Performed by: FAMILY MEDICINE

## 2025-06-05 PROCEDURE — 3017F COLORECTAL CA SCREEN DOC REV: CPT | Performed by: FAMILY MEDICINE

## 2025-06-05 PROCEDURE — 1123F ACP DISCUSS/DSCN MKR DOCD: CPT | Performed by: FAMILY MEDICINE

## 2025-06-05 RX ORDER — UREA 40 G/100G
LOTION TOPICAL
COMMUNITY

## 2025-06-05 RX ORDER — METRONIDAZOLE 10 MG/G
GEL TOPICAL
COMMUNITY

## 2025-06-05 ASSESSMENT — VISUAL ACUITY
OS_CC: 20/20
OD_CC: 20/20

## 2025-06-05 NOTE — PATIENT INSTRUCTIONS
Eating Healthy Foods: Care Instructions  With every meal, you can make healthy food choices. Try to eat a variety of fruits, vegetables, whole grains, lean proteins, and low-fat dairy products. This can help you get the right balance of nutrients, including vitamins and minerals. Small changes add up over time. You can start by adding one healthy food to your meals each day.    Try to make half your plate fruits and vegetables, one-fourth whole grains, and one-fourth lean proteins. Try including dairy with your meals.   Eat more fruits and vegetables. Try to have them with most meals and snacks.   Foods for healthy eating        Fruits   These can be fresh, frozen, canned, or dried.  Try to choose whole fruit rather than fruit juice.  Eat a variety of colors.        Vegetables   These can be fresh, frozen, canned, or dried.  Beans, peas, and lentils count too.        Whole grains   Choose whole-grain breads, cereals, and noodles.  Try brown rice.        Lean proteins   These can include lean meat, poultry, fish, and eggs.  You can also have tofu, beans, peas, lentils, nuts, and seeds.        Dairy   Try milk, yogurt, and cheese.  Choose low-fat or fat-free when you can.  If you need to, use lactose-free milk or fortified plant-based milk products, such as soy milk.        Water   Drink water when you're thirsty.  Limit sugar-sweetened drinks, including soda, fruit drinks, and sports drinks.  Where can you learn more?  Go to https://www.[employer].net/patientEd and enter T756 to learn more about \"Eating Healthy Foods: Care Instructions.\"  Current as of: October 7, 2024  Content Version: 14.5  © 3845-4684 [employer].   Care instructions adapted under license by [employer]. If you have questions about a medical condition or this instruction, always ask your healthcare professional. [employer], disclaims any warranty or liability for your use of this information.         Starting a

## 2025-06-05 NOTE — PROGRESS NOTES
Medicare Annual Wellness Visit    Natalia Desouza is here for Medicare AWV    Assessment & Plan  Medicare annual wellness examination  - Blood pressure normal  - Previous cholesterol levels indicate no need for medication  - BMI in the 30s  - Advised to reduce snacking and maintain balanced diet  - Encouraged annual eye examination  - Discussed safety measures: nonslip mats and grab bars  - Recommended shingles vaccine and follow CDC COVID-19 vaccination guidelines  - Pneumonia vaccine administered today  - Ordered blood work for cholesterol and glucose monitoring  Medicare annual wellness visit, subsequent  Need for pneumococcal vaccine  -     Pneumococcal, PCV20, PREVNAR 20, (age 6w+), IM, PF  Need for shingles vaccine  Screening cholesterol level  -     Lipid Panel  Prediabetes  -     Hemoglobin A1C  Weight gain    Results       No follow-ups on file.     Subjective     History of Present Illness  69-year-old female presents for Medicare annual wellness exam.    Current Health Concerns  - No current health concerns  - Plans to reduce snacking to improve BMI (currently in the 30s)    Safety Measures  - Uses textured nonslip surfaces and sturdy bar in shower, no grab bar installed  - No fear of slipping or falling when entering/exiting tub    Physical Activity  - Attends yoga classes Tuesdays and Thursdays at 4:00 PM and a 6:00 AM class with friends  - Performs yard work, causing arm fatigue    Vaccinations  - Never received shingles vaccine  - Received COVID-19 vaccine in 10/2024 with regular influenza vaccination    Supplemental information: Recently had a mammogram [facility] a health [facility].    SOCIAL HISTORY  Retired last Friday.    Patient's complete Health Risk Assessment and screening values have been reviewed and are found in Flowsheets. The following problems were reviewed today and where indicated follow up appointments were made and/or referrals ordered.    Positive Risk Factor Screenings with Interventions:

## 2025-06-06 ENCOUNTER — RESULTS FOLLOW-UP (OUTPATIENT)
Dept: FAMILY MEDICINE CLINIC | Age: 70
End: 2025-06-06

## 2025-06-06 DIAGNOSIS — R73.03 PREDIABETES: Primary | ICD-10-CM

## 2025-09-06 ENCOUNTER — HOSPITAL ENCOUNTER (OUTPATIENT)
Dept: LAB | Age: 70
Discharge: HOME | End: 2025-09-06
Payer: MEDICARE

## 2025-09-06 DIAGNOSIS — R73.03 PREDIABETES: ICD-10-CM

## 2025-09-06 LAB
EST. AVERAGE GLUCOSE BLD GHB EST-MCNC: 136 MG/DL
HBA1C MFR BLD: 6.4 % (ref 4.2–6.3)

## 2025-09-06 PROCEDURE — 83036 HEMOGLOBIN GLYCOSYLATED A1C: CPT

## (undated) DEVICE — ENDOSCOPY KIT: Brand: MEDLINE INDUSTRIES, INC.